# Patient Record
Sex: FEMALE | Race: WHITE | Employment: FULL TIME | URBAN - METROPOLITAN AREA
[De-identification: names, ages, dates, MRNs, and addresses within clinical notes are randomized per-mention and may not be internally consistent; named-entity substitution may affect disease eponyms.]

---

## 2019-04-23 ENCOUNTER — OFFICE VISIT (OUTPATIENT)
Dept: ORTHOPEDIC SURGERY | Age: 49
End: 2019-04-23
Payer: COMMERCIAL

## 2019-04-23 VITALS
SYSTOLIC BLOOD PRESSURE: 112 MMHG | BODY MASS INDEX: 21.16 KG/M2 | HEIGHT: 65 IN | WEIGHT: 127 LBS | HEART RATE: 68 BPM | DIASTOLIC BLOOD PRESSURE: 78 MMHG

## 2019-04-23 DIAGNOSIS — M25.551 PAIN OF RIGHT HIP JOINT: Primary | ICD-10-CM

## 2019-04-23 DIAGNOSIS — M70.61 GREATER TROCHANTERIC BURSITIS OF RIGHT HIP: ICD-10-CM

## 2019-04-23 DIAGNOSIS — M76.899 HAMSTRING TENDINITIS: ICD-10-CM

## 2019-04-23 PROCEDURE — 99203 OFFICE O/P NEW LOW 30 MIN: CPT | Performed by: ORTHOPAEDIC SURGERY

## 2019-04-23 ASSESSMENT — ENCOUNTER SYMPTOMS
EYES NEGATIVE: 1
COUGH: 1
ALLERGIC/IMMUNOLOGIC NEGATIVE: 1
SINUS PRESSURE: 1
GASTROINTESTINAL NEGATIVE: 1
SINUS PAIN: 1

## 2019-04-23 NOTE — PROGRESS NOTES
Subjective:      Patient ID: Jennifer Gresham is a 51 y.o. female. HPI  Jennifer Gresham presents today for evaluation of bilateral hamstring discomfort. She said issues for about a month. She was playing tennis and feels like her legs move as quickly as they should. She can't accelerate. She's had hamstring strains in the past but nothing of significance. At worst pain can be 4 or 5 out of 10. She is an avid  as well as some participates in yoga and cross training. She denies numbness or tingling. She occasionally has some right hip pain as well. She is otherwise healthy. Review of Systems   Constitutional: Negative. HENT: Positive for sinus pressure and sinus pain. Eyes: Negative. Respiratory: Positive for cough. Cardiovascular: Negative. Gastrointestinal: Negative. Endocrine: Negative. Genitourinary: Negative. Musculoskeletal: Positive for arthralgias and gait problem. Right Hip and B Hamstring pain   Skin: Negative. Allergic/Immunologic: Negative. Hematological: Negative. Psychiatric/Behavioral: Negative. Objective:   Physical Exam  General Exam:    Vitals: Blood pressure 112/78, pulse 68, height 5' 5\" (1.651 m), weight 127 lb (57.6 kg). Constitutional: Patient is adequately groomed with no evidence of malnutrition  Mental Status: The patient is oriented to time, place and person. The patient's mood and affect are appropriate. Gait:  Patient walks with normal gait and station. Lymphatic: The lymphatic examination bilaterally reveals all areas to be without enlargement or induration. Vascular: Examination reveals no swelling or calf tenderness. Peripheral pulses are palpable and 2+. Neurological: The patient has good coordination. There is no weakness or sensory deficit. Skin:    Head/Neck: inspection reveals no rashes, ulcerations or lesions. Trunk:  inspection reveals no rashes, ulcerations or lesions.   Right Lower Extremity: inspection reveals no rashes, ulcerations or lesions. Left Lower Extremity: inspection reveals no rashes, ulcerations or lesions. Right hamstring has no obvious deformity or ecchymosis. Straight leg raise is negative at greater than 100°. She has good strength mild discomfort over the ischial tuberosity. She has discomfort in the gluteal region with bridge exercise. She has mild discomfort over the trochanteric bursa on the right side. Left hamstring is no deformity or ecchymosis. Straight leg raises is negative at 110 degrees. .  She has good strength and no tenderness at all. Xray obtained today AP pelvis. It demonstrates: No bony abnormalities  Assessment:      Bilateral gluteal weakness with proximal hamstring tendinitis and right sided trochanteric bursitis      Plan:      I believe she would benefit from activity modification including avoidance of tennis for the next month followed by appropriate physical therapy with an emphasis on gluteal strengthening. She is in agreement and understands this. Follow-up with me on an as-needed basis. This note was created using voice recognition software. It has been proofread, but occasionally errors remain. Please disregard these errors. They will be corrected as they are noted.

## 2019-04-26 ENCOUNTER — HOSPITAL ENCOUNTER (OUTPATIENT)
Dept: PHYSICAL THERAPY | Age: 49
Setting detail: THERAPIES SERIES
Discharge: HOME | End: 2019-04-26
Payer: COMMERCIAL

## 2019-04-26 PROCEDURE — 97110 THERAPEUTIC EXERCISES: CPT

## 2019-04-26 PROCEDURE — 97161 PT EVAL LOW COMPLEX 20 MIN: CPT

## 2019-04-26 NOTE — PLAN OF CARE
[facility], [address] [place], [address]  Phone: [phone]   Fax: [phone]                                                              Physical Therapy Certification    Dear Referring Practitioner: Flory Adams,    We had the pleasure of evaluating the following patient for physical therapy services at [address] [place] [address]. A summary of our findings can be found in the initial assessment below. This includes our plan of care. If you have any questions or concerns regarding these findings, please do not hesitate to contact me at the office phone number checked above. Thank you for the referral.       Physician Signature:_______________________________Date:__________________  By signing above (or electronic signature), therapists plan is approved by physician      Patient: Lalita Laureano   : 1970   MRN: [id number]  Referring Physician: Referring Practitioner: Beth      Evaluation Date: 2019      Medical Diagnosis Information:  Diagnosis: M76.899 (ICD-10-CM) - Hamstring tendinitis; M70.61 (ICD-10-CM) - Greater trochanteric bursitis of right hip   Treatment Diagnosis: M25.551 (ICD-10-CM) - Pain of right hip joint                                           Precautions/ Contra-indications:   Latex Allergy:  ?NO      ? YES  Preferred Language for Healthcare:   ?English       ? other:    SUBJECTIVE:   Per MD : Lalita Laureano presents today for evaluation of bilateral hamstring discomfort. She said issues for about a month. She was playing tennis and feels like her legs move as quickly as they should. She can't accelerate. She's had hamstring strains in the past but nothing of significance. At worst pain can be 4 or 5 out of 10. She is an avid  as well as some participates in yoga and cross training. She denies numbness or tingling. She occasionally has some right hip pain as well.   She (Obers test) + obers + obers    Hip flexor(Solitario test)      gastroc      Rectus femoris(Elys test)              Orthopedic Special Tests:    Special  Test Left Right Comments   FABERS neg Mild discomfort     Scour test neg neg    Impingement test      Trendelenburg test                                     ? Patient history, allergies, meds reviewed. Medical chart reviewed. See intake form. Review Of Systems (ROS):  ?Performed Review of systems (Integumentary, CardioPulmonary, Neurological) by intake and observation. Intake form has been scanned into medical record. Patient has been instructed to contact their primary care physician regarding ROS issues if not already being addressed at this time. Co-morbidities/Complexities (which will affect course of rehabilitation): x? None           Arthritic conditions   ? Rheumatoid arthritis (M05.9)  ? Osteoarthritis (M19.91)   Cardiovascular conditions   ? Hypertension (I10)  ? Hyperlipidemia (E78.5)  ? Angina pectoris (I20)  ? Atherosclerosis (I70)   Musculoskeletal conditions   ? Disc pathology   ? Congenital spine pathologies   ? Prior surgical intervention  ? Osteoporosis (M81.8)  ? Osteopenia (M85.8)   Endocrine conditions   ? Hypothyroid (E03.9)  ? Hyperthyroid Gastrointestinal conditions   ? Constipation (E49.41)   Metabolic conditions   ? Morbid obesity (E66.01)  ? Diabetes type 1(E10.65) or 2 (E11.65)   ? Neuropathy (G60.9)     Pulmonary conditions   ? Asthma (J45)  ? Coughing   ? COPD (J44.9)   Psychological Disorders  ? Anxiety (F41.9)  ? Depression (F32.9)   ? Other:   ?Other:          Barriers to/and or personal factors that will affect rehab potential:              ?Age  ? Sex              ? Motivation/Lack of Motivation                        ? Co-Morbidities              ? Cognitive Function, education/learning barriers              ? Environmental, home barriers              ? profession/work barriers  ? past PT/medical experience  ? other:      Falls Risk Assessment (30 days): ? Falls Risk assessed and no intervention required. ? Falls Risk assessed and Patient requires intervention due to being higher risk   TUG score (>12s at risk):     ? Falls education provided, including       Functional Assessment: PT G-Eileen  Functional Assessment Tool Used: LEFS  Score: 12%   Functional Limitation: Mobility: Walking and moving around    ASSESSMENT:   Functional Impairments:     ? Noted lumbar/proximal hip/LE joint hypomobility   ? Decreased LE functional ROM   x? Decreased core/proximal hip strength and neuromuscular control   x? Decreased LE functional strength   ? Reduced balance/proprioceptive control   ?other:      Functional Activity Limitations (from functional questionnaire and intake)   x? Reduced ability to tolerate prolonged functional positions   ? Reduced ability or difficulty with changes of positions or transfers between positions   ? Reduced ability to maintain good posture and demonstrate good body mechanics with sitting, bending, and lifting   ? Reduced ability to sleep   ? Reduced ability or tolerance with driving and/or computer work   ? Reduced ability to perform lifting, carrying tasks   ? Reduced ability to squat   ? Reduced ability to forward bend   x? Reduced ability to ambulate prolonged functional periods/distances/surfaces   ? Reduced ability to ascend/descend stairs   x? Reduced ability to run, hop, cut or jump   ?other:    Participation Restrictions   x? Reduced participation in self care activities   ? Reduced participation in home management activities   ? Reduced participation in work activities   x? Reduced participation in social activities. x? Reduced participation in sport/recreation activities. Classification :    ?Signs/symptoms consistent with post-surgical status including decreased ROM, strength and function. ? Signs/symptoms consistent with joint sprain/strain   ? Signs/symptoms consistent with patella-femoral syndrome   ? Signs/symptoms consistent with knee OA/hip OA   ?Signs/symptoms consistent with internal derangement of knee/Hip   x? Signs/symptoms consistent with functional hip weakness/NMR control      x? Signs/symptoms consistent with tendinitis/tendinosis    ?signs/symptoms consistent with pathology which may benefit from Dry needling      ?other:      Prognosis/Rehab Potential:      ?Excellent   x? Good    ? Fair   ? Poor    Tolerance of evaluation/treatment:    ?Excellent   x? Good    ? Fair   ? Poor    PLAN  Frequency/Duration:  1-2 days per week for 4-6 Weeks:  Interventions:  x? Therapeutic exercise including: strength training, ROM, for Lower extremity and core   x? NMR activation and proprioception for LE, Glutes and Core   x? Manual therapy as indicated for LE, Hip and spine to include: Dry Needling/IASTM, STM, PROM, Gr I-IV mobilizations, manipulation. x? Modalities as needed that may include: thermal agents, E-stim, Biofeedback, US, iontophoresis as indicated  x? Patient education on joint protection, postural re-education, activity modification, progression of HEP. HEP instruction:The patient's home exercise program was reviewed and they were educated on appropriate frequency, duration and intensity. The enclosed activities were performed and new exercises were added to H.E. P. with detailed pictures included. (see scanned forms)    GOALS:  Patient stated goal: reduce pain, return to tennis     Therapist goals for Patient:   Short Term Goals: To be achieved in: 2 weeks  1. Independent in HEP and progression per patient tolerance, in order to prevent re-injury. 2. Patient will have a decrease in pain to facilitate improvement in movement, function, and ADLs as indicated by Functional Deficits. Long Term Goals: To be achieved in: 4-6 weeks  1. Disability index score of 6% or less for the LEFS to assist with reaching prior level of function.    2. Patient will demonstrate increased AROM to ER painfree to allow for proper joint functioning as indicated by

## 2019-04-26 NOTE — FLOWSHEET NOTE
[address] [facility]                         Physical Therapy Daily Treatment Note  Date:  2019    Patient Name:  Butch Friday    :  1970  MRN: [id number]    Medical/Treatment Diagnosis Information:  · Diagnosis: M76.899 (ICD-10-CM) - Hamstring tendinitis; M70.61 (ICD-10-CM) - Greater trochanteric bursitis of right hip  · Treatment Diagnosis: M25.551 (ICD-10-CM) - Pain of right hip joint  Insurance/Certification information:  PT Insurance Information: [place]   Physician Information:  Referring Practitioner: Henrique Pan of care signed (Y/N):     Date of Patient follow up with Physician:     Functional Assessment:  19  Functional Assessment Tool Used: LEFS  Score: 12%   Functional Limitation: Mobility: Walking and moving around    Progress Note: ?  Yes  ? No  Next due by: Visit #10       Latex Allergy:  ?NO      ? YES  Preferred Language for Healthcare:   ?English       ? other    Visit # Insurance Allowable   1 60     Pain level:  4-5/10     SUBJECTIVE:  See eval    OBJECTIVE: See eval   Observation:    Test measurements:    ROM PROM AROM Comments    Left Right Left Right    Flexion        Extension        Abduction        Adduction        ER        IR                  Flexibility Left Right Comments   Hamstrings      ITB (Obers test)      Hip flexor(Solitario test)      gastroc      Rectus femoris(Elys test)              Special  Test Left Right Comments   FABERS      Scour test      Impingement test      Trendelenburg test              Strength Left Right Comments   Hip flexors      Hip extension      Hip abduction      Hip adduction      Hip ER      Hip IR      Quads      Hamstrings            RESTRICTIONS/PRECAUTIONS    Exercises/Interventions:     Exercise/Equipment Resistance Repetitions Other comments   Stretching      Hamstring      Hip Flexion      ITB- Rope [id number]      Grion      Quad      Inclined Calf      Towel Pull      Piriformis [id number] SLR      Supine      Prone      Abduction 3x10     Adducton      SLR+      Clamshells 3x10            Isometrics      Quad sets      Ball Squeezes      Glut sets -[id number]  -L>R x5 10sec hold   -R>L x5 10 sec hold   -Bilateral then relax L>R, R>L x5 each                  Patellar Glides      Medial      Superior      Inferior            ROM      Passive      Active      Weight Shift      Hang Weights      Sheet Pulls      Ankle Pumps            CKC      Calf raises      Wall sits      Step ups      1 leg stand      Squatting      CC TKE      Balance      Monster Walks      Bridging      Triple threats      Stool Scoots      PRE      Extension   RANGE:   Flexion   RANGE:         Cable Column            Leg Press   RANGE:         Bike      Treadmill                  Therapeutic Exercise and NMR EXR  ? (20654) Provided verbal/tactile cueing for activities related to strengthening, flexibility, endurance, ROM for improvements in LE, proximal hip, and core control with self care, mobility, lifting, ambulation. ? (33704) Provided verbal/tactile cueing for activities related to improving balance, coordination, kinesthetic sense, posture, motor skill, proprioception  to assist with LE, proximal hip, and core control in self care, mobility, lifting, ambulation and eccentric single leg control.      NMR and Therapeutic Activities:    ? (37595 or 93237) Provided verbal/tactile cueing for activities related to improving balance, coordination, kinesthetic sense, posture, motor skill, proprioception and motor activation to allow for proper function of core, proximal hip and LE with self care and ADLs  ? (14507) Gait Re-education- Provided training and instruction to the patient for proper LE, core and proximal hip recruitment and positioning and eccentric body weight control with ambulation re-education including up and down stairs     Home Exercise Program:    ? (22912) Reviewed/Progressed HEP activities 4+/5 or greater  to allow for proper functional mobility as indicated by patients Functional Deficits. 4. Patient will return to ambulation on all surfaces without increased symptoms or restriction. 5. Patient will be able to return to playing tennis without increased symptoms or restriction. Progression Towards Functional goals:  ? Patient is progressing as expected towards functional goals listed. ? Progression is slowed due to complexities listed. ? Progression has been slowed due to co-morbidities. ? Plan just implemented, too soon to assess goals progression  ? Other:     ASSESSMENT:  See eval    Treatment/Activity Tolerance:  ? Patient tolerated treatment well ? Patient limited by fatique  ? Patient limited by pain  ? Patient limited by other medical complications  ? Other:     Prognosis: ? Good ? Fair  ? Poor    Patient Requires Follow-up: ? Yes  ? No    PLAN: See eval  ? Continue per plan of care ? Alter current plan (see comments)  ? Plan of care initiated ? Hold pending MD visit ?  Discharge    Electronically signed by:   Oliver Carpenter, PT, DPT, Cert DN

## 2019-05-02 ENCOUNTER — HOSPITAL ENCOUNTER (OUTPATIENT)
Dept: PHYSICAL THERAPY | Age: 49
Setting detail: THERAPIES SERIES
Discharge: HOME | End: 2019-05-02
Payer: COMMERCIAL

## 2019-05-02 PROCEDURE — 97530 THERAPEUTIC ACTIVITIES: CPT

## 2019-05-02 PROCEDURE — 97112 NEUROMUSCULAR REEDUCATION: CPT

## 2019-05-02 PROCEDURE — 97110 THERAPEUTIC EXERCISES: CPT

## 2019-05-02 NOTE — FLOWSHEET NOTE
[facility] [facility]                         Physical Therapy Daily Treatment Note  Date:  2019    Patient Name:  Mario Ontiveros    :  1970  MRN: [id number]    Medical/Treatment Diagnosis Information:  · Diagnosis: M76.899 (ICD-10-CM) - Hamstring tendinitis; M70.61 (ICD-10-CM) - Greater trochanteric bursitis of right hip  · Treatment Diagnosis: M25.551 (ICD-10-CM) - Pain of right hip joint  Insurance/Certification information:  PT Insurance Information: [place]   Physician Information:  Referring Practitioner: Erin Cm of care signed (Y/N):     Date of Patient follow up with Physician:     Functional Assessment:  19  Functional Assessment Tool Used: LEFS  Score: 12%   Functional Limitation: Mobility: Walking and moving around    Progress Note: ?  Yes  ? No  Next due by: Visit #10       Latex Allergy:  ?NO      ? YES  Preferred Language for Healthcare:   ?English       ? other    Visit # Insurance Allowable   2 60     Pain level: 3/10     SUBJECTIVE:  Pt reports she feels less discomfort in buttocks with sitting. She was feeling better along side of hip until Tuesday and feels like she may have tried to overstretch and it is slightly irritated again.      OBJECTIVE: See eval   Observation:    Test measurements:    ROM PROM AROM Comments    Left Right Left Right    Flexion        Extension        Abduction        Adduction        ER        IR                  Flexibility Left Right Comments   Hamstrings      ITB (Obers test)      Hip flexor(Solitario test)      gastroc      Rectus femoris(Elys test)              Special  Test Left Right Comments   FABERS      Scour test      Impingement test      Trendelenburg test              Strength Left Right Comments   Hip flexors      Hip extension      Hip abduction      Hip adduction      Hip ER      Hip IR      Quads      Hamstrings            RESTRICTIONS/PRECAUTIONS    Exercises/Interventions: Exercise/Equipment Resistance Repetitions Other comments   Stretching      Hamstring      Hip Flexion      ITB- Rope [id number]      Grion      Quad      Inclined Calf      Towel Pull      Piriformis [id number]                        SLR      Supine      Prone      Abduction 3x10     Adducton      SLR+      Clamshells 3x10 green band            Isometrics      Quad sets      Ball Squeezes      Glut sets -[id number]                   Patellar Glides      Medial      Superior      Inferior            ROM      Passive      Active      Weight Shift      Hang Weights      Sheet Pulls      Ankle Pumps            CKC      Calf raises      Wall sits      Step ups      1 leg stand      Squatting rockerboard mini squat hold [id number]      CC TKE      Balance      Monster Walks 3 shorts laps green      Bridging 3x10  Focus on glut squeeze before lifting     Triple threats      Stool Scoots      PRE      Extension   RANGE:   Flexion   RANGE:         Cable Column            Leg Press   RANGE:         Bike 8 min 2.5 resistance      Treadmill                  Therapeutic Exercise and NMR EXR  ? (96282) Provided verbal/tactile cueing for activities related to strengthening, flexibility, endurance, ROM for improvements in LE, proximal hip, and core control with self care, mobility, lifting, ambulation. ? (39338) Provided verbal/tactile cueing for activities related to improving balance, coordination, kinesthetic sense, posture, motor skill, proprioception  to assist with LE, proximal hip, and core control in self care, mobility, lifting, ambulation and eccentric single leg control.      NMR and Therapeutic Activities:    ? (66128 or 13428) Provided verbal/tactile cueing for activities related to improving balance, coordination, kinesthetic sense, posture, motor skill, proprioception and motor activation to allow for proper function of core, proximal hip and LE with self care and ADLs  ? (92232) Gait Re-education- Provided training and instruction to the patient for proper LE, core and proximal hip recruitment and positioning and eccentric body weight control with ambulation re-education including up and down stairs     Home Exercise Program:    ? (69210) Reviewed/Progressed HEP activities related to strengthening, flexibility, endurance, ROM of core, proximal hip and LE for functional self-care, mobility, lifting and ambulation/stair navigation   ? (43512)Reviewed/Progressed HEP activities related to improving balance, coordination, kinesthetic sense, posture, motor skill, proprioception of core, proximal hip and LE for self care, mobility, lifting, and ambulation/stair navigation      Manual Treatments:  PROM / STM / Oscillations-Mobs:  G-I, II, III, IV (PA's, Inf., Post.)  ? (53165) Provided manual therapy to mobilize LE, proximal hip and/or LS spine soft tissue/joints for the purpose of modulating pain, promoting relaxation,  increasing ROM, reducing/eliminating soft tissue swelling/inflammation/restriction, improving soft tissue extensibility and allowing for proper ROM for normal function with self care, mobility, lifting and ambulation. Modalities:      Charges:  Timed Code Treatment Minutes: 43   Total Treatment Minutes: 69     ? EVAL (LOW) 26782   ? EVAL (MOD) 31475   ? EVAL (HIGH) 35929   ? RE-EVAL   x? CF(62737) x   1  ? IONTO  x? NMR (88066) x 1     ? VASO  ? Manual (71406) x      ? Other:  x? TA x  1    ? Mech Traction (21463)  ? ES(attended) (23030)      ? ES (un) (81405):       GOALS:   Patient stated goal: reduce pain, return to tennis     Therapist goals for Patient:   Short Term Goals: To be achieved in: 2 weeks  1. Independent in HEP and progression per patient tolerance, in order to prevent re-injury. 2. Patient will have a decrease in pain to facilitate improvement in movement, function, and ADLs as indicated by Functional Deficits. Long Term Goals: To be achieved in: 4-6 weeks  1.  Disability index score of 6% or less

## 2019-05-10 ENCOUNTER — HOSPITAL ENCOUNTER (OUTPATIENT)
Dept: PHYSICAL THERAPY | Age: 49
Setting detail: THERAPIES SERIES
Discharge: HOME | End: 2019-05-10
Payer: COMMERCIAL

## 2019-05-10 PROCEDURE — 97110 THERAPEUTIC EXERCISES: CPT

## 2019-05-10 PROCEDURE — 97140 MANUAL THERAPY 1/> REGIONS: CPT

## 2019-05-10 PROCEDURE — 97112 NEUROMUSCULAR REEDUCATION: CPT

## 2019-05-10 NOTE — FLOWSHEET NOTE
[facility]                         Physical Therapy Daily Treatment Note  Date:  5/10/2019    Patient Name:  Quinton Batres    :  1970  MRN: [id number]    Medical/Treatment Diagnosis Information:  · Diagnosis: M76.899 (ICD-10-CM) - Hamstring tendinitis; M70.61 (ICD-10-CM) - Greater trochanteric bursitis of right hip  · Treatment Diagnosis: M25.551 (ICD-10-CM) - Pain of right hip joint  Insurance/Certification information:  PT Insurance Information: [place]   Physician Information:  Referring Practitioner: Nemiah Schools of care signed (Y/N):     Date of Patient follow up with Physician:     Functional Assessment:  19  Functional Assessment Tool Used: LEFS  Score: 12%   Functional Limitation: Mobility: Walking and moving around    Progress Note: ?  Yes  ? No  Next due by: Visit #10       Latex Allergy:  ?NO      ? YES  Preferred Language for Healthcare:   ?English       ? other    Visit # Insurance Allowable   3 60     Pain level: 3/10     SUBJECTIVE:  Pt reports lateral hip and now anterior right hip are continuing to have pain with sitting/standing/walking. She did try to do a light jog and had increased pain in right hip. She has been standing more at work but was in a long meeting mid week and notes she had increased pain with having to sit so long.      OBJECTIVE: See eval   Observation:    Test measurements:    ROM PROM AROM Comments    Left Right Left Right    Flexion        Extension        Abduction        Adduction        ER        IR                  Flexibility Left Right Comments   Hamstrings      ITB (Obers test)      Hip flexor(Solitario test)      gastroc      Rectus femoris(Elys test)              Special  Test Left Right Comments   FABERS      Scour test      Impingement test      Trendelenburg test              Strength Left Right Comments   Hip flexors      Hip extension      Hip abduction      Hip adduction      Hip ER      Hip IR Quads      Hamstrings            RESTRICTIONS/PRECAUTIONS    Exercises/Interventions:     Exercise/Equipment Resistance Repetitions Other comments   Stretching      Hamstring      Hip Flexor Half kneel runners [id number]     ITB- Rope [id number]      Grion      Quad      Inclined Calf      Towel Pull      Piriformis [id number]                        SLR      Supine      Prone      Abduction 3x10     Adducton      SLR+      Clamshells 3x10 green band            Isometrics      Quad sets      Ball Squeezes      Glut sets -[id number] b                   Patellar Glides      Medial      Superior      Inferior            ROM      Passive      Active      Weight Shift      Hang Weights      Sheet Pulls      Ankle Pumps            CKC      Calf raises      Wall sits      Step ups      1 leg stand      Squatting rockerboard mini squat 3x10     CC TKE      Balance      Monster Walks 3 shorts laps green      Bridging 3x10 feet on bosu Focus on glut squeeze before lifting     Triple threats      Hip sliders 3x10      PRE      Extension   RANGE:   Flexion   RANGE:         Cable Column            Leg Press   RANGE:         Bike 8 min 2.5 resistance      Treadmill                  Therapeutic Exercise and NMR EXR  ? (01059) Provided verbal/tactile cueing for activities related to strengthening, flexibility, endurance, ROM for improvements in LE, proximal hip, and core control with self care, mobility, lifting, ambulation. ? (14656) Provided verbal/tactile cueing for activities related to improving balance, coordination, kinesthetic sense, posture, motor skill, proprioception  to assist with LE, proximal hip, and core control in self care, mobility, lifting, ambulation and eccentric single leg control.      NMR and Therapeutic Activities:    ? (08977 or 32473) Provided verbal/tactile cueing for activities related to improving balance, coordination, kinesthetic sense, posture, motor skill, proprioception and motor activation to allow for proper function of core, proximal hip and LE with self care and ADLs  ? (92321) Gait Re-education- Provided training and instruction to the patient for proper LE, core and proximal hip recruitment and positioning and eccentric body weight control with ambulation re-education including up and down stairs     Home Exercise Program:    ? (21568) Reviewed/Progressed HEP activities related to strengthening, flexibility, endurance, ROM of core, proximal hip and LE for functional self-care, mobility, lifting and ambulation/stair navigation   ? (69615)Reviewed/Progressed HEP activities related to improving balance, coordination, kinesthetic sense, posture, motor skill, proprioception of core, proximal hip and LE for self care, mobility, lifting, and ambulation/stair navigation      Manual Treatments:  PROM / STM / Oscillations-Mobs:  G-I, II, III, IV (PA's, Inf., Post.)  ? (16927) Provided manual therapy to mobilize LE, proximal hip and/or LS spine soft tissue/joints for the purpose of modulating pain, promoting relaxation,  increasing ROM, reducing/eliminating soft tissue swelling/inflammation/restriction, improving soft tissue extensibility and allowing for proper ROM for normal function with self care, mobility, lifting and ambulation. Myofascial roller: right piriformis and ITB 8'     Modalities:      Charges:  Timed Code Treatment Minutes: 47   Total Treatment Minutes: 60     ? EVAL (LOW) 01962   ? EVAL (MOD) 26558   ? EVAL (HIGH) 05019   ? RE-EVAL   x? ZO(56314) x   1  ? IONTO  ? NMR (20872) x      ? VASO  x? Manual (79580) x 1     ? Other:  x? TA x  1    ? [facility] Traction (75965)  ? ES(attended) (91811)      ? ES (un) (03408):       GOALS:   Patient stated goal: reduce pain, return to tennis     Therapist goals for Patient:   Short Term Goals: To be achieved in: 2 weeks  1. Independent in HEP and progression per patient tolerance, in order to prevent re-injury.    2. Patient will have a decrease in pain to facilitate improvement in movement, function, and ADLs as indicated by Functional Deficits. Long Term Goals: To be achieved in: 4-6 weeks  1. Disability index score of 6% or less for the LEFS to assist with reaching prior level of function. 2. Patient will demonstrate increased AROM to ER painfree to allow for proper joint functioning as indicated by patients Functional Deficits. 3. Patient will demonstrate an increase in Strength to 4+/5 or greater  to allow for proper functional mobility as indicated by patients Functional Deficits. 4. Patient will return to ambulation on all surfaces without increased symptoms or restriction. 5. Patient will be able to return to playing tennis without increased symptoms or restriction. Progression Towards Functional goals:  ? Patient is progressing as expected towards functional goals listed. ? Progression is slowed due to complexities listed. ? Progression has been slowed due to co-morbidities. ? Plan just implemented, too soon to assess goals progression  ? Other:     ASSESSMENT:  See eval    Treatment/Activity Tolerance:  ? Patient tolerated treatment well ? Patient limited by fatique  ? Patient limited by pain  ? Patient limited by other medical complications  ? Other:   Pt has a few areas of adhesions along ITB and in piriformis with myofasical roller but is able to tolerate moderate pressure. We discussed possibility of IASTM or DN to area as needed, pt has myoroller at home to use. Prognosis: ? Good ? Fair  ? Poor    Patient Requires Follow-up: ? Yes  ? No    PLAN: See eval  ? Continue per plan of care ? Alter current plan (see comments)  ? Plan of care initiated ? Hold pending MD visit ?  Discharge    Electronically signed by:   Haja Calix, PT, DPT, Cert DN

## 2019-05-16 ENCOUNTER — APPOINTMENT (OUTPATIENT)
Dept: PHYSICAL THERAPY | Age: 49
End: 2019-05-16
Payer: COMMERCIAL

## 2019-05-21 ENCOUNTER — HOSPITAL ENCOUNTER (OUTPATIENT)
Dept: PHYSICAL THERAPY | Age: 49
Setting detail: THERAPIES SERIES
Discharge: HOME | End: 2019-05-21
Payer: COMMERCIAL

## 2019-05-21 PROCEDURE — 97530 THERAPEUTIC ACTIVITIES: CPT

## 2019-05-21 PROCEDURE — 97110 THERAPEUTIC EXERCISES: CPT

## 2019-05-21 PROCEDURE — 97140 MANUAL THERAPY 1/> REGIONS: CPT

## 2019-05-21 NOTE — FLOWSHEET NOTE
[facility] [facility]                         Physical Therapy Daily Treatment Note  Date:  2019    Patient Name:  Rolly Valdivia    :  1970  MRN: [id number]    Medical/Treatment Diagnosis Information:  · Diagnosis: M76.899 (ICD-10-CM) - Hamstring tendinitis; M70.61 (ICD-10-CM) - Greater trochanteric bursitis of right hip  · Treatment Diagnosis: M25.551 (ICD-10-CM) - Pain of right hip joint  Insurance/Certification information:  PT Insurance Information: [place]   Physician Information:  Referring Practitioner: Doug Myers of [employer] signed (Y/N):     Date of Patient follow up with Physician:     Functional Assessment:  19  Functional Assessment Tool Used: LEFS  Score: 12%   Functional Limitation: Mobility: Walking and moving around    Progress Note: ?  Yes  ? No  Next due by: Visit #10       Latex Allergy:  ?NO      ? YES  Preferred Language for Healthcare:   ?English       ? other    Visit # Insurance Allowable   4 60     Pain level: -2/10     SUBJECTIVE:  Pt reports she has noticed improvements in hamstring discomfort with sitting. She notes that she still has discomfort laterally on right side but has been doing more rolling and feels some mild improvement.      OBJECTIVE: See eval   Observation:    Test measurements:    ROM PROM AROM Comments    Left Right Left Right    Flexion        Extension        Abduction        Adduction        ER        IR                  Flexibility Left Right Comments   Hamstrings      ITB (Obers test)      Hip flexor(Solitario test)      gastroc      Rectus femoris(Elys test)              Special  Test Left Right Comments   FABERS      Scour test      Impingement test      Trendelenburg test              Strength Left Right Comments   Hip flexors      Hip extension      Hip abduction      Hip adduction      Hip ER      Hip IR      Quads      Hamstrings            RESTRICTIONS/PRECAUTIONS    Exercises/Interventions: Exercise/Equipment Resistance Repetitions Other comments   Stretching      Hamstring      Hip Flexor Half kneel runners [id number]     ITB- Rope [id number]      Grion      Quad      Inclined Calf      Towel Pull      Piriformis 0t29ozz                        SLR      Supine      Prone      Abduction 3x10 2#     Adducton      SLR+      Clamshells 3x10 green band            Isometrics      Quad sets      Ball Squeezes      Glut sets -05t93wpu b                   Patellar Glides      Medial      Superior      Inferior            ROM      Passive      Active      Weight Shift      Hang Weights      Sheet Pulls      Ankle Pumps            CKC      Calf raises      Wall sits      Step ups      1 leg stand      Squatting rockerboard mini squat 3x10     CC TKE      Balance      Monster Walks 3 shorts laps green      Bridging 3x10 feet on bosu Focus on glut squeeze before lifting     Triple threats      Hip sliders 3x10      PRE      Extension   RANGE:   Flexion   RANGE:         Cable Column            Leg Press   RANGE:           Treadmill                  Therapeutic Exercise and NMR EXR  ? (42322) Provided verbal/tactile cueing for activities related to strengthening, flexibility, endurance, ROM for improvements in LE, proximal hip, and core control with self care, mobility, lifting, ambulation. ? (23149) Provided verbal/tactile cueing for activities related to improving balance, coordination, kinesthetic sense, posture, motor skill, proprioception  to assist with LE, proximal hip, and core control in self care, mobility, lifting, ambulation and eccentric single leg control.      NMR and Therapeutic Activities:    ? (04687 or 08332) Provided verbal/tactile cueing for activities related to improving balance, coordination, kinesthetic sense, posture, motor skill, proprioception and motor activation to allow for proper function of core, proximal hip and LE with self care and ADLs  ? (20241) Gait Re-education- Provided training and instruction to the patient for proper LE, core and proximal hip recruitment and positioning and eccentric body weight control with ambulation re-education including up and down stairs     Home Exercise Program:    ? (07786) Reviewed/Progressed HEP activities related to strengthening, flexibility, endurance, ROM of core, proximal hip and LE for functional self-care, mobility, lifting and ambulation/stair navigation   ? (15037)Reviewed/Progressed HEP activities related to improving balance, coordination, kinesthetic sense, posture, motor skill, proprioception of core, proximal hip and LE for self care, mobility, lifting, and ambulation/stair navigation      Manual Treatments:  PROM / STM / Oscillations-Mobs:  G-I, II, III, IV (PA's, Inf., Post.)  ? (40692) Provided manual therapy to mobilize LE, proximal hip and/or LS spine soft tissue/joints for the purpose of modulating pain, promoting relaxation,  increasing ROM, reducing/eliminating soft tissue swelling/inflammation/restriction, improving soft tissue extensibility and allowing for proper ROM for normal function with self care, mobility, lifting and ambulation. Myofascial roller: right piriformis and ITB 8'   Instrument Assisted Soft Tissue Mobilization (IASTM): was applied to the following muscles: Right glut, ITB  with [facility]  tools including HG2 (handle-bar), HG4 (small can-opener), HG5 (medium can-opener), HG 8 (biscotti), and HG9 (tongue depressor). Treatment consisted of IASTM strokes including sweeping, fanning, brushing, strumming, filleting, pinning and framing, based on body region contours, nature of the soft tissue restriction and desired treatment outcomes. These techniques were used to restore neurophysiology, improve mechanotransduction, enhance fluid dynamics and break collagen crosslinks. The treatment area was exposed and the patient was draped in an appropriate manner.  Upon completion the clinician cleaned the IASTM tools as per limited by pain  ? Patient limited by other medical complications  ? Other:   Pt able to tolerate moderate pressure with IASTM today, was most restricted in distal ITB and gluts near greater troch. Exercises and stretching performed afterwards to assist with tissue realignment. Pt notes that with stretches she feels more flexible after IASTM. Continue with manual tx as needed for a few more sessions to resolve pts symptoms. Prognosis: ? Good ? Fair  ? Poor    Patient Requires Follow-up: ? Yes  ? No    PLAN: See eval  ? Continue per plan of care ? Alter current plan (see comments)  ? Plan of care initiated ? Hold pending MD visit ?  Discharge    Electronically signed by:   Rosalina Rico, PT, DPT, Cert DN

## 2019-05-28 ENCOUNTER — HOSPITAL ENCOUNTER (OUTPATIENT)
Dept: PHYSICAL THERAPY | Age: 49
Setting detail: THERAPIES SERIES
Discharge: HOME | End: 2019-05-28
Payer: COMMERCIAL

## 2019-05-28 PROCEDURE — 97110 THERAPEUTIC EXERCISES: CPT

## 2019-05-28 PROCEDURE — 97140 MANUAL THERAPY 1/> REGIONS: CPT

## 2019-05-28 NOTE — FLOWSHEET NOTE
[facility]                         Physical Therapy Daily Treatment Note  Date:  2019    Patient Name:  Jennifer Gresham    :  1970  MRN: [id number]    Medical/Treatment Diagnosis Information:  · Diagnosis: M76.899 (ICD-10-CM) - Hamstring tendinitis; M70.61 (ICD-10-CM) - Greater trochanteric bursitis of right hip  · Treatment Diagnosis: M25.551 (ICD-10-CM) - Pain of right hip joint  Insurance/Certification information:  PT Insurance Information: [place]   Physician Information:  Referring Practitioner: Emanuel Beltran of care signed (Y/N):     Date of Patient follow up with Physician:     Functional Assessment:  19  Functional Assessment Tool Used: LEFS  Score: 12%   Functional Limitation: Mobility: Walking and moving around    Progress Note: ?  Yes  ? No  Next due by: Visit #10       Latex Allergy:  ?NO      ? YES  Preferred Language for Healthcare:   ?English       ? other    Visit # Insurance Allowable   5 60     Pain level: -2/10     SUBJECTIVE:  Pt reports that she did feel improvements with IASTM last week but it did not last long. She notes that she played tennis and had very little discomfort but did avoid lateral motions to right side if she felt ball was out of reach without lunging towards with right leg.    OBJECTIVE: See eval   Observation:    Test measurements:    ROM PROM AROM Comments    Left Right Left Right    Flexion        Extension        Abduction        Adduction        ER        IR                  Flexibility Left Right Comments   Hamstrings      ITB (Obers test)      Hip flexor(Solitario test)      gastroc      Rectus femoris(Elys test)              Special  Test Left Right Comments   FABERS      Scour test      Impingement test      Trendelenburg test              Strength Left Right Comments   Hip flexors      Hip extension      Hip abduction      Hip adduction      Hip ER      Hip IR      Quads      Hamstrings RESTRICTIONS/PRECAUTIONS    Exercises/Interventions:     Exercise/Equipment Resistance Repetitions Other comments   Stretching      Hamstring      Hip Flexor Half kneel runners [id number]  Stretching before and after DN    ITB- Rope [id number]      Grion      Quad      Inclined Calf      Towel Pull      Piriformis [id number]                    SLR    Supine    Prone    Abduction    Adducton    SLR+    Clamshells        Isometrics    Quad sets    Ball Squeezes    Glut sets            Patellar Glides    Medial    Superior    Inferior        ROM    Passive    Active    Weight Shift    Hang Weights    Sheet Pulls    Ankle Pumps        CKC    Calf raises    Wall sits    Step ups    1 leg stand    Squatting    CC TKE    Balance    Monster Walks    Bridging    Triple threats    Hip sliders    PRE      Extension   RANGE:   Flexion   RANGE:         Cable Column            Leg Press   RANGE:           Treadmill                  Therapeutic Exercise and NMR EXR  ? (04353) Provided verbal/tactile cueing for activities related to strengthening, flexibility, endurance, ROM for improvements in LE, proximal hip, and core control with self care, mobility, lifting, ambulation. ? (87425) Provided verbal/tactile cueing for activities related to improving balance, coordination, kinesthetic sense, posture, motor skill, proprioception  to assist with LE, proximal hip, and core control in self care, mobility, lifting, ambulation and eccentric single leg control.      NMR and Therapeutic Activities:    ? (73739 or 79566) Provided verbal/tactile cueing for activities related to improving balance, coordination, kinesthetic sense, posture, motor skill, proprioception and motor activation to allow for proper function of core, proximal hip and LE with self care and ADLs  ? (47476) Gait Re-education- Provided training and instruction to the patient for proper LE, core and proximal hip recruitment and positioning and eccentric body weight control with ambulation re-education including up and down stairs     Home Exercise Program:    ? (97450) Reviewed/Progressed HEP activities related to strengthening, flexibility, endurance, ROM of core, proximal hip and LE for functional self-care, mobility, lifting and ambulation/stair navigation   ? (54755)Reviewed/Progressed HEP activities related to improving balance, coordination, kinesthetic sense, posture, motor skill, proprioception of core, proximal hip and LE for self care, mobility, lifting, and ambulation/stair navigation      Manual Treatments:  PROM / STM / Oscillations-Mobs:  G-I, II, III, IV (PA's, Inf., Post.)  ? (61596) Provided manual therapy to mobilize LE, proximal hip and/or LS spine soft tissue/joints for the purpose of modulating pain, promoting relaxation,  increasing ROM, reducing/eliminating soft tissue swelling/inflammation/restriction, improving soft tissue extensibility and allowing for proper ROM for normal function with self care, mobility, lifting and ambulation. PT reviewed precautions, contraindications, and indications with pt in addition to application of dry needling within established plan of care and expected outcomes; pt verbalized understanding with all questions answered. Pt had signed consent form for dry needling and PT obtained written consent with updated plan of care from MD before beginning. Dry needling manual therapy: consisted on the placement of 4 needles in the following muscles: ITB near greater trochanter,  A 75 mm needle was inserted, piston, rotated, and coned to produce intramuscular mobilization. These techniques were used to restore functional range of motion, reduce muscle spasm and induce healing in the corresponding musculature. (57500)  Clean Technique was utilized today while applying Dry needling treatment. The treatment sites where cleaned with 70% solution of  isopropyl alcohol .   The PT washed their hands and utilized treatment gloves along with hand  prior to inserting the needles. All needles where removed and discarded in the appropriate sharps container. Modalities:      Charges:  Timed Code Treatment Minutes: 41   Total Treatment Minutes: 09     ? EVAL (LOW) 38437   ? EVAL (MOD) 38743   ? EVAL (HIGH) 51861   ? RE-EVAL   x? AE(86041) x   2  ? IONTO  ? NMR (86253) x      ? VASO  x? Manual (70598) x 1     ? Other:  ? TA x      ? Mech Traction (88281)  ? ES(attended) (25370)      ? ES (un) (80814):       GOALS:   Patient stated goal: reduce pain, return to tennis     Therapist goals for Patient:   Short Term Goals: To be achieved in: 2 weeks  1. Independent in HEP and progression per patient tolerance, in order to prevent re-injury. 2. Patient will have a decrease in pain to facilitate improvement in movement, function, and ADLs as indicated by Functional Deficits. Long Term Goals: To be achieved in: 4-6 weeks  1. Disability index score of 6% or less for the LEFS to assist with reaching prior level of function. 2. Patient will demonstrate increased AROM to ER painfree to allow for proper joint functioning as indicated by patients Functional Deficits. 3. Patient will demonstrate an increase in Strength to 4+/5 or greater  to allow for proper functional mobility as indicated by patients Functional Deficits. 4. Patient will return to ambulation on all surfaces without increased symptoms or restriction. 5. Patient will be able to return to playing tennis without increased symptoms or restriction. Progression Towards Functional goals:  ? Patient is progressing as expected towards functional goals listed. ? Progression is slowed due to complexities listed. ? Progression has been slowed due to co-morbidities. ? Plan just implemented, too soon to assess goals progression  ? Other:     ASSESSMENT:  See eval    Treatment/Activity Tolerance:  ? Patient tolerated treatment well ? Patient limited by fatique  ?  Patient limited by pain  ? Patient limited by other medical complications  ? Other:   Pt tolerated DN well with minimal discomfort. She notes increased flexibility with stretches following needling. Monitor symptoms following DN and continue if beneficial as tolerated       Prognosis: ? Good ? Fair  ? Poor    Patient Requires Follow-up: ? Yes  ? No    PLAN: See eval  ? Continue per plan of care ? Alter current plan (see comments)  ? Plan of care initiated ? Hold pending MD visit ?  Discharge    Electronically signed by:   Carol Uriostegui, PT, DPT, Cert DN

## 2021-11-22 ENCOUNTER — OFFICE VISIT (OUTPATIENT)
Dept: ORTHOPEDIC SURGERY | Age: 51
End: 2021-11-22
Payer: COMMERCIAL

## 2021-11-22 VITALS — HEIGHT: 65 IN | BODY MASS INDEX: 21.66 KG/M2 | WEIGHT: 130 LBS | RESPIRATION RATE: 12 BRPM

## 2021-11-22 DIAGNOSIS — M25.561 ACUTE PAIN OF RIGHT KNEE: Primary | ICD-10-CM

## 2021-11-22 DIAGNOSIS — S83.281A TRAUMATIC TEAR OF LATERAL MENISCUS OF RIGHT KNEE, INITIAL ENCOUNTER: ICD-10-CM

## 2021-11-22 DIAGNOSIS — M23.91 INTERNAL DERANGEMENT OF RIGHT KNEE: ICD-10-CM

## 2021-11-22 PROCEDURE — 99214 OFFICE O/P EST MOD 30 MIN: CPT | Performed by: ORTHOPAEDIC SURGERY

## 2021-11-22 NOTE — PROGRESS NOTES
Fili Bateman is seen today for evaluation of right knee injury. She was playing tennis this weekend when she felt a pop in the lateral aspect of the posterior right knee. She fell to the ground and pain. She now has pain that is 8 out of 10 with bending and 6 out of 10 with stairs in the lateral right knee. She is using ice and ibuprofen. She denies prior right knee problems. She works in [employer] and is otherwise healthy other than hypothyroid. History: Patient's relevant past family, medical, and social history are reviewed as part of today's visit. ROS of pertinent positives and negatives as above; otherwise negative. General Exam:    Vitals: Resp. rate 12, height 5' 5\" (1.651 m), weight 130 lb (59 kg). Constitutional: Patient is adequately groomed with no evidence of malnutrition  Mental Status: The patient is oriented to time, place and person. The patient's mood and affect are appropriate. Gait:  Patient walks with an antalgic gait  Lymphatic: The lymphatic examination bilaterally reveals all areas to be without enlargement or induration. Vascular: Examination reveals no swelling or calf tenderness. Peripheral pulses are palpable and 2+. Neurological: The patient has good coordination. There is no weakness or sensory deficit. Skin:    Head/Neck: inspection reveals no rashes, ulcerations or lesions. Trunk:  inspection reveals no rashes, ulcerations or lesions. Right Lower Extremity: inspection reveals no rashes, ulcerations or lesions. Left Lower Extremity: inspection reveals no rashes, ulcerations or lesions. Examination of the bilateral hips reveals normal flexion and extension. There is no restriction in rotation. There is no tenderness to palpation anteriorly posteriorly or laterally. Left knee examination demonstrates no effusion. There is no tenderness to palpation over the medial or lateral joint line. There is no discomfort over the patellar tendon.   There is no palpable popliteal cyst.  Sensation is intact. Range of motion is normal.  There is no patellofemoral crepitation. There is no instability to varus or valgus stress applied at 0, 30, 60, or 90Â° of flexion. There is no anterior or posterior drawer. Lachman examination is normal.    Right knee has tenderness over the lateral joint line and posterior lateral knee. She has significant pain with flexion beyond 90 degrees. She has moderate pain in extension. She has no drainable effusion although there is some swelling. She has pain with Osiris's maneuver laterally as well. I cannot elicit gross instability. There is no ecchymosis or deformity. X-rays were obtained today in the office and interpreted by me. AP standing, PA flexed, and merchant views of the bilateral knees as well as a lateral of the right knee. These demonstrate:  No bony abnormalities. Assessment: Differential diagnosis includes lateral hamstring or gastroc strain versus lateral meniscus tear. Plan: MRI right knee.     Follow-up with me after the scan

## 2021-11-23 ENCOUNTER — TELEPHONE (OUTPATIENT)
Dept: ORTHOPEDIC SURGERY | Age: 51
End: 2021-11-23

## 2021-11-23 NOTE — TELEPHONE ENCOUNTER
Appointment Request     Patient requesting earlier appointment: Yes  Appointment offered to patient: PATIENT HAD MRI TODAY AND WOULD LIKE TO BE SEEN TOMORROW 11/24/21  Patient Contact Number: [phone]

## 2021-11-24 ENCOUNTER — OFFICE VISIT (OUTPATIENT)
Dept: ORTHOPEDIC SURGERY | Age: 51
End: 2021-11-24
Payer: COMMERCIAL

## 2021-11-24 VITALS — WEIGHT: 130 LBS | BODY MASS INDEX: 21.66 KG/M2 | HEIGHT: 65 IN | RESPIRATION RATE: 12 BRPM

## 2021-11-24 DIAGNOSIS — M23.91 INTERNAL DERANGEMENT OF RIGHT KNEE: ICD-10-CM

## 2021-11-24 DIAGNOSIS — M25.561 ACUTE PAIN OF RIGHT KNEE: Primary | ICD-10-CM

## 2021-11-24 PROCEDURE — 99213 OFFICE O/P EST LOW 20 MIN: CPT | Performed by: ORTHOPAEDIC SURGERY

## 2021-11-24 RX ORDER — MELOXICAM 15 MG/1
15 TABLET ORAL DAILY
Qty: 30 TABLET | Refills: 1 | Status: SHIPPED | OUTPATIENT
Start: 2021-11-24 | End: 2022-01-24

## 2021-11-29 ENCOUNTER — HOSPITAL ENCOUNTER (OUTPATIENT)
Dept: PHYSICAL THERAPY | Age: 51
Setting detail: THERAPIES SERIES
Discharge: HOME | End: 2021-11-29
Payer: COMMERCIAL

## 2021-11-29 PROCEDURE — 97530 THERAPEUTIC ACTIVITIES: CPT

## 2021-11-29 PROCEDURE — 97161 PT EVAL LOW COMPLEX 20 MIN: CPT

## 2021-11-29 PROCEDURE — 97110 THERAPEUTIC EXERCISES: CPT

## 2021-11-29 NOTE — FLOWSHEET NOTE
[address] [place] [address] [facility], [place]                                                         Physical Therapy Daily Treatment Note  Date:  2021    Patient Name:  Sandor Quinonez    :  1970  MRN: [id number]    Medical/Treatment Diagnosis Information:  · Diagnosis: M25.561 (ICD-10-CM) - Acute pain of right knee M23.91 (ICD-10-CM) - Internal derangement of right knee  · Treatment Diagnosis: M25.561 (ICD-10-CM) - Acute pain of right knee  Insurance/Certification information:  PT Insurance Information: [place], 60PT, no auth, $0CP  Physician Information:  Referring Practitioner: Tabitha Talbot  Has the plan of care been signed (Y/N):        []  Yes  [x]  No     Date of Patient follow up with Physician: Not scheduled      Is this a Progress Report:     []  Yes  [x]  No        If Yes:  Date Range for reporting period:  Beginning  Ending    Progress report will be due (10 Rx or 30 days whichever is less):        Recertification will be due (POC Duration  / 90 days whichever is less):          Visit # Insurance Allowable Auth Required   1 [place], 60PT []  Yes [x]  No        Functional Scale: LEFS 45%    Date assessed: 21     Latex Allergy:  [x]NO      []YES  Preferred Language for Healthcare:   [x]English       []other:    Pain level: 0/10 at rest    SUBJECTIVE:  See eval    OBJECTIVE: See eval   Observation:    Test measurements:      Flexibility L R Comment   Hamstring      Gastroc      ITB      Quad                ROM PROM AROM Overpressure Comment    L R L R L R    Flexion          Extension                                  Strength L R Comment   Quad      Hamstring      Gastroc      Hip flexor      Hip ABD                      Special Test Results/Comment   Meniscal Click    Crepitus    Flexion Test    Valgus Laxity    Varus Laxity    Lachmans    Drop Back    Homans            Girth L R   Mid Patella     Suprapatellar for proper function of core, proximal hip and LE with self care and ADLs  [] (52012) Gait Re-education- Provided training and instruction to the patient for proper LE, core and proximal hip recruitment and positioning and eccentric body weight control with ambulation re-education including up and down stairs     Home Exercise Program:    [x] (92590) Reviewed/Progressed HEP activities related to strengthening, flexibility, endurance, ROM of core, proximal hip and LE for functional self-care, mobility, lifting and ambulation/stair navigation   [] (25102)Reviewed/Progressed HEP activities related to improving balance, coordination, kinesthetic sense, posture, motor skill, proprioception of core, proximal hip and LE for self care, mobility, lifting, and ambulation/stair navigation      Manual Treatments:  PROM / STM / Oscillations-Mobs:  G-I, II, III, IV (PA's, Inf., Post.)  [] (80467) Provided manual therapy to mobilize LE, proximal hip and/or LS spine soft tissue/joints for the purpose of modulating pain, promoting relaxation,  increasing ROM, reducing/eliminating soft tissue swelling/inflammation/restriction, improving soft tissue extensibility and allowing for proper ROM for normal function with self care, mobility, lifting and ambulation. Modalities:      Charges:  Timed Code Treatment Minutes: 25   Total Treatment Minutes: 45     [x] EVAL (LOW) 97505   [] EVAL (MOD) 73614   [] EVAL (HIGH) 71166   [] RE-EVAL   [x] DW(47332) x 1    [] IONTO  [] NMR (59640) x     [] VASO  [] Manual (52696) x      [] Other:  [x] TA x   1   [] Mech Traction (93814)  [] ES(attended) (03413)      [] ES (un) (00957):       HEP instruction: Access Code: [email or web address]  URL: [email or web address]  Date: 11/29/2021  Prepared by: Gomez Zacarias    Exercises  Standing Gastroc Stretch on Step - 1 x daily - 7 x weekly - 3 sets - 30 sec hold  Supine Active Straight Leg Raise - 1 x daily - 7 x weekly - 3 sets - 10 reps  Sidelying Hip Abduction - 1 x daily - 7 x weekly - 3 sets - 10 reps  Sitting Heel Slide with Towel - 1 x daily - 7 x weekly - 10 reps - 10 sec hold  Standing Terminal Knee Extension with Resistance - 1 x daily - 7 x weekly - 3 sets - 10 reps  Single Leg Stance with Support - 1 x daily - 7 x weekly - 3 sets - 30 sec hold      GOALS:  Patient stated goal: get back to tennis and working out  [] Progressing: [] Met: [] Not Met: [] Adjusted    Therapist goals for Patient:   Short Term Goals: To be achieved in: 2 weeks  1. Independent in HEP and progression per patient tolerance, in order to prevent re-injury. [] Progressing: [] Met: [] Not Met: [] Adjusted   2. Patient will have a decrease in pain to facilitate improvement in movement, function, and ADLs as indicated by Functional Deficits. [] Progressing: [] Met: [] Not Met: [] Adjusted    Long Term Goals: To be achieved in: 4-6 weeks  1. Disability index score of 22% or less for the LEFS to assist with reaching prior level of function. [] Progressing: [] Met: [] Not Met: [] Adjusted  2. Patient will demonstrate increased AROM of knee flexion to 140deg or greater to allow for proper joint functioning as indicated by patients Functional Deficits. [] Progressing: [] Met: [] Not Met: [] Adjusted  3. Patient will demonstrate an increase in Strength of hamstring, quadricep, and hip abductors to  to allow for proper functional mobility as indicated by patients Functional Deficits. [] Progressing: [] Met: [] Not Met: [] Adjusted  4. Patient will return to running, cutting and jumping activities demo'd in the clinic without increased symptoms or restriction to be able to return to tennis. [] Progressing: [] Met: [] Not Met: [] Adjusted     Overall Progression Towards Functional goals/ Treatment Progress Update:  [] Patient is progressing as expected towards functional goals listed. [] Progression is slowed due to complexities/Impairments listed.   [] Progression has been slowed due to co-morbidities. [x] Plan just implemented, too soon to assess goals progression <30days   [] Goals require adjustment due to lack of progress  [] Patient is not progressing as expected and requires additional follow up with physician  [] Other    Prognosis for POC: [x] Good [] Fair  [] Poor      Patient requires continued skilled intervention: [x] Yes  [] No    Treatment/Activity Tolerance:  [x] Patient able to complete treatment  [] Patient limited by fatigue  [] Patient limited by pain     [] Patient limited by other medical complications  [] Other: Pt able to complete exercises with proper form, VC required for control of exercises. Pt noted mild inc pain during TKE with dec in pain following ed provided for slow and controlling the motion. Continue to progress as tolerated. Return to Play: (if applicable)   []  Stage 1: Intro to Strength   []  Stage 2: Return to Run and Strength   []  Stage 3: Return to Jump and Strength   []  Stage 4: Dynamic Strength and Agility   []  Stage 5: Sport Specific Training     []  Ready to Return to Play, Meets All Above Stages   []  Not Ready for Return to Sports   Comments:                               PLAN: See eval  [] Continue per plan of care [] Alter current plan (see comments above)  [x] Plan of care initiated [] Hold pending MD visit [] Discharge  Note: If patient does not return for scheduled/ recommended follow up visits, this note will serve as a discharge from care along with most recent update on progress. Reviewed insurance benefits for physical therapy in an outpatient hospital based setting with the patient, including deductible  and allowable visit number. Pt was informed of possible out of pocket costs, as well as, informed of other service options for continuing supervised sessions without required skilled PT intervention such as the [facility] program.     Electronically signed by:    Avtar Kim SPT  Therapist was present, directed the patient's care, made skilled judgement, and was responsible for assessment and treatment of the patient. Patient was in agreement to be treated by student physical therapist with licensed physical therapist present.      Isaac Smith, PT, DPT, Cert DN

## 2021-11-29 NOTE — PLAN OF CARE
[facility], [address] [place], [address]     Phone: [phone]   Fax: [phone]                                                           Physical Therapy Certification    Dear Referring Practitioner: Carmen Escamilla,    We had the pleasure of evaluating the following patient for physical therapy services at [address] [place] [address]. A summary of our findings can be found in the initial assessment below. This includes our plan of care. If you have any questions or concerns regarding these findings, please do not hesitate to contact me at the office phone number checked above. Thank you for the referral.       Physician Signature:_______________________________Date:__________________  By signing above (or electronic signature), therapists plan is approved by physician      Patient: Donovan Veloz   : 1970   MRN: [id number]  Referring Physician: Referring Practitioner: Beth      Evaluation Date: 2021      Medical Diagnosis Information:  Diagnosis: M25.561 (ICD-10-CM) - Acute pain of right knee M23.91 (ICD-10-CM) - Internal derangement of right knee   Treatment Diagnosis: M25.561 (ICD-10-CM) - Acute pain of right knee                                           Precautions/ Contra-indications:   Latex Allergy:  [x]NO      []YES  Preferred Language for Healthcare:   [x]English       []Other:    C-SSRS Triggered by Intake questionnaire (Past 2 wk assessment):   [x] No, Questionnaire did not trigger screening.   [] Yes, Patient intake triggered further evaluation      [] C-SSRS Screening completed  [] PCP notified via Plan of Care  [] Emergency services notified         SUBJECTIVE: Pt began feeling pain immediately after swinging tennis racket with twisting. She didn't notice any swelling. She states it has been getting better.  Beginning last week, the pain hasn't been as bad and only notices a sharp shooting pain when she actively flexes her knee. She has 0/10 pain at rest and pain can get to 7-8/10 at its worst. She does notice increased stiffness when standing or sitting for >1 hour. She has tried the elliptical for 20 minutes without any pain. Steps were initially really painful, but she hasn't noticed much pain with this since. Relevant Medical History: Not significant  Functional Disability Index:LEFS    Pain Scale: 0/10 at rest  Easing factors: Resting, ice, Advil  Provocative factors: knee flexion, quad activation    Type: []Constant   [x]Intermittent  []Radiating []Localized []other:     Numbness/Tingling: Pt denies    Functional Limitations/Impairments: []Sitting []Standing []Walking    [x]Squatting/bending  []Stairs           []ADL's  []Transfers [x]Sports/Recreation []Other:    Occupation/School: [employer]    Living Status/Prior Level of Function: Independent with ADLs and IADLs, Tennis, working out  (insert highest prior level of function)    OBJECTIVE:     Joint mobility:    []Normal    []Hypo   [x]Hyper    Palpation: No TTP lateral, Mild TTP on medial aspect     Functional Mobility/Transfers: IND    Posture:  Forward head, rounded shoulders    Bandages/Dressings/Incisions: IND    Gait: (include devices/WB status) IND        Flexibility L R Comment   Hamstring neutral neutral 90/90 test   Gastroc      ITB - obers - obers    Quad                ROM PROM AROM Overpressure Comment    L R L R L R    Flexion   142 132      Extension   3 deg hyper 3 deg hyper                              Strength L R Comment   Quad 4+ 4-    Hamstring 4 4-    Gastroc 4+ 4+    Hip flexor 4 4    Hip ABD 4 4-                  Orthopedic Special Tests:   Special Test Results/Comment   Meniscal Click    Crepitus    Flexion Test    Valgus Laxity    Varus Laxity    Lachmans    Drop Back    Homans            Girth L R   Mid Patella     Suprapatellar     5cm above     15cm above                              [x] Patient history, allergies, meds reviewed. Medical chart reviewed. See intake form. Review Of Systems (ROS):  [x]Performed Review of systems (Integumentary, CardioPulmonary, Neurological) by intake and observation. Intake form has been scanned into medical record. Patient has been instructed to contact their primary care physician regarding ROS issues if not already being addressed at this time. Co-morbidities/Complexities (which will affect course of rehabilitation):  [x]None           Arthritic conditions   []Rheumatoid arthritis (M05.9)  []Osteoarthritis (M19.91)   Cardiovascular conditions   []Hypertension (I10)  []Hyperlipidemia (E78.5)  []Angina pectoris (I20)  []Atherosclerosis (I70)   Musculoskeletal conditions   []Disc pathology   []Congenital spine pathologies   []Prior surgical intervention  []Osteoporosis (M81.8)  []Osteopenia (M85.8)   Endocrine conditions   []Hypothyroid (E03.9)  []Hyperthyroid Gastrointestinal conditions   []Constipation (G15.52)   Metabolic conditions   []Morbid obesity (E66.01)  []Diabetes type 1(E10.65) or 2 (E11.65)   []Neuropathy (G60.9)     Pulmonary conditions   []Asthma (J45)  []Coughing   []COPD (J44.9)   Psychological Disorders  []Anxiety (F41.9)  []Depression (F32.9)   []Other:   []Other:          Barriers to/and or personal factors that will affect rehab potential:              []Age  []Sex              []Motivation/Lack of Motivation                        []Co-Morbidities              []Cognitive Function, education/learning barriers              []Environmental, home barriers              []profession/work barriers  []past PT/medical experience  []other:    Falls Risk Assessment (30 days):  [x] Falls Risk assessed and no intervention required.   [] Falls Risk assessed and Patient requires intervention due to being higher risk   TUG score (>12s at risk):     [] Falls education provided, including           Functional Assessment:   Functional Assessment scale used: LEFS  Score: Potential:      []Excellent   [x]Good    []Fair   []Poor    Tolerance of evaluation/treatment:    []Excellent   [x]Good    []Fair   []Poor    PLAN  Frequency/Duration:  1-2 days per week for 4-6 Weeks:  Interventions:  [x]  Therapeutic exercise including: strength training, ROM, for Lower extremity and core   [x]  NMR activation and proprioception for LE, Glutes and Core   [x]  Manual therapy as indicated for LE, Hip and spine to include: Dry Needling/IASTM, STM, PROM, Gr I-IV mobilizations, manipulation. [x] Modalities as needed that may include: thermal agents, E-stim, Biofeedback, US, iontophoresis as indicated  [x] Patient education on joint protection, postural re-education, activity modification, progression of HEP. HEP instruction: Access Code: [email or web address]  URL: [email or web address]  Date: 11/29/2021  Prepared by: Momo Velasco    Exercises  Standing Gastroc Stretch on Step - 1 x daily - 7 x weekly - 3 sets - 30 sec hold  Supine Active Straight Leg Raise - 1 x daily - 7 x weekly - 3 sets - 10 reps  Sidelying Hip Abduction - 1 x daily - 7 x weekly - 3 sets - 10 reps  Sitting Heel Slide with Towel - 1 x daily - 7 x weekly - 10 reps - 10 sec hold  Standing Terminal Knee Extension with Resistance - 1 x daily - 7 x weekly - 3 sets - 10 reps  Single Leg Stance with Support - 1 x daily - 7 x weekly - 3 sets - 30 sec hold      GOALS:  Patient stated goal: get back to tennis and working out  [] Progressing: [] Met: [] Not Met: [] Adjusted    Therapist goals for Patient:   Short Term Goals: To be achieved in: 2 weeks  1. Independent in HEP and progression per patient tolerance, in order to prevent re-injury. [] Progressing: [] Met: [] Not Met: [] Adjusted   2. Patient will have a decrease in pain to facilitate improvement in movement, function, and ADLs as indicated by Functional Deficits. [] Progressing: [] Met: [] Not Met: [] Adjusted    Long Term Goals: To be achieved in: 4-6 weeks  1. Disability index score of 22% or less for the LEFS to assist with reaching prior level of function. [] Progressing: [] Met: [] Not Met: [] Adjusted  2. Patient will demonstrate increased AROM of knee flexion to 140deg or greater to allow for proper joint functioning as indicated by patients Functional Deficits. [] Progressing: [] Met: [] Not Met: [] Adjusted  3. Patient will demonstrate an increase in Strength of hamstring, quadricep, and hip abductors to 4+/5 to allow for proper functional mobility as indicated by patients Functional Deficits. [] Progressing: [] Met: [] Not Met: [] Adjusted  4. Patient will return to running, cutting and jumping activities demo'd in the clinic without increased symptoms or restriction to be able to return to tennis. [] Progressing: [] Met: [] Not Met: [] Adjusted     Physical Therapy Evaluation Complexity Justification  [] A history of present problem with:  [x] no personal factors and/or comorbidities that impact the plan of care;  []1-2 personal factors and/or comorbidities that impact the plan of care  []3 personal factors and/or comorbidities that impact the plan of care  [] An examination of body systems using standardized tests and measures addressing any of the following: body structures and functions (impairments), activity limitations, and/or participation restrictions;:  [] a total of 1-2 or more elements   [x] a total of 3 or more elements   [] a total of 4 or more elements   [] A clinical presentation with:  [x] stable and/or uncomplicated characteristics   [] evolving clinical presentation with changing characteristics  [] unstable and unpredictable characteristics;   [] Clinical decision making of [x] low, [] moderate, [] high complexity using standardized patient assessment instrument and/or measurable assessment of functional outcome.     [x] EVAL (LOW) 75085 (typically 20 minutes face-to-face)  [] EVAL (MOD) 28579 (typically 30 minutes face-to-face)  [] EVAL (HIGH) 79441 (typically 45 minutes face-to-face)  [] RE-EVAL 34452  Electronically signed by: Kaylin Rizvi, SPT  Therapist was present, directed the patient's care, made skilled judgement, and was responsible for assessment and treatment of the patient. Patient was in agreement to be treated by student physical therapist with licensed physical therapist present.      Pream Barajas, PT, DPT, Cert DN

## 2021-12-06 ENCOUNTER — HOSPITAL ENCOUNTER (OUTPATIENT)
Dept: PHYSICAL THERAPY | Age: 51
Setting detail: THERAPIES SERIES
Discharge: HOME | End: 2021-12-06
Payer: COMMERCIAL

## 2021-12-06 PROCEDURE — 97530 THERAPEUTIC ACTIVITIES: CPT

## 2021-12-06 PROCEDURE — 97110 THERAPEUTIC EXERCISES: CPT

## 2021-12-06 PROCEDURE — 97112 NEUROMUSCULAR REEDUCATION: CPT

## 2021-12-06 NOTE — FLOWSHEET NOTE
Flexion          Extension                                  Strength L R Comment   Quad      Hamstring      Gastroc      Hip flexor      Hip ABD                      Special Test Results/Comment   Meniscal Click    Crepitus    Flexion Test    Valgus Laxity    Varus Laxity    Lachmans    Drop Back    Homans            Girth L R   Mid Patella     Suprapatellar     5cm above     15cm above            RESTRICTIONS/PRECAUTIONS:     Exercises/Interventions:     Exercise/Equipment Resistance/Repetitions Other comments   Stretching     Hamstring     Hip Flexion     ITB     Grion     Quad     Inclined Calf [id number]            SLR     Supine 3x10, 1# cuff weight    Prone     Abduction 3x10, 1# cuff weight    Adducton 2x10    SLR+     Clams                    Isometrics     Quad sets     Hip Adduction     Hamstring                    Patellar Glides     Medial     Superior     Inferior          ROM     Sheet Pulls 10x10    Wall Slides     Edge of bed     Flexionator     Extensionator     Hang Weights     Ankle Pumps                              CKC     Calf raises     Wall sits [id number]    Step ups     Step up and over     Lateral Step Downs 3x10, L1              Mini squats     CC TKE 3x10, 4 plates         Lateral band walks     Side Planks     Half moon     Single leg flow          Biodex-balance     Single leg stance [id number], airex    Plyoback          Stool scoots     SB bridge     HS Curls Standing, 3x10, no resistance    Planks          PRE     Extension  RANGE: 90/40   Flexion  RANGE: 0/90        Cable Column          Leg Press  RANGE: 70/10        Bike 5', no resistance    Treadmill                     Therapeutic Exercise and NMR EXR  [] (04508) Provided verbal/tactile cueing for activities related to strengthening, flexibility, endurance, ROM for improvements in LE, proximal hip, and core control with self care, mobility, lifting, ambulation.  [] (79817) Provided verbal/tactile cueing for activities related to improving balance, coordination, kinesthetic sense, posture, motor skill, proprioception  to assist with LE, proximal hip, and core control in self care, mobility, lifting, ambulation and eccentric single leg control. NMR and Therapeutic Activities:    [] (30849 or 32967) Provided verbal/tactile cueing for activities related to improving balance, coordination, kinesthetic sense, posture, motor skill, proprioception and motor activation to allow for proper function of core, proximal hip and LE with self care and ADLs  [] (20044) Gait Re-education- Provided training and instruction to the patient for proper LE, core and proximal hip recruitment and positioning and eccentric body weight control with ambulation re-education including up and down stairs     Home Exercise Program:    [x] (64808) Reviewed/Progressed HEP activities related to strengthening, flexibility, endurance, ROM of core, proximal hip and LE for functional self-care, mobility, lifting and ambulation/stair navigation   [] (65533)Reviewed/Progressed HEP activities related to improving balance, coordination, kinesthetic sense, posture, motor skill, proprioception of core, proximal hip and LE for self care, mobility, lifting, and ambulation/stair navigation      Manual Treatments:  PROM / STM / Oscillations-Mobs:  G-I, II, III, IV (PA's, Inf., Post.)  [] (50210) Provided manual therapy to mobilize LE, proximal hip and/or LS spine soft tissue/joints for the purpose of modulating pain, promoting relaxation,  increasing ROM, reducing/eliminating soft tissue swelling/inflammation/restriction, improving soft tissue extensibility and allowing for proper ROM for normal function with self care, mobility, lifting and ambulation.      Modalities:      Charges:  Timed Code Treatment Minutes: 42   Total Treatment Minutes: 45     [] EVAL (LOW) 11836   [] EVAL (MOD) 94025   [] EVAL (HIGH) 05903   [] RE-EVAL   [x] UQ(82802) x 1    [] IONTO  [x] NMR (42818) x 1    [] VASO  [] Manual (19050) x      [] Other:  [x] TA x   1   [] Mech Traction (85107)  [] ES(attended) (59071)      [] ES (un) (71745):       HEP instruction: Access Code: [email or web address]  URL: [email or web address]  Date: 12/06/2021  Prepared by: Nathaly Arce    Exercises  Standing Gastroc Stretch on Step - 1 x daily - 7 x weekly - 3 sets - 30 sec hold  Supine Active Straight Leg Raise - 1 x daily - 7 x weekly - 3 sets - 10 reps  Sidelying Hip Abduction - 1 x daily - 7 x weekly - 3 sets - 10 reps  Sitting Heel Slide with Towel - 1 x daily - 7 x weekly - 10 reps - 10 sec hold  Standing Terminal Knee Extension with Resistance - 1 x daily - 7 x weekly - 3 sets - 10 reps  Sidelying Hip Adduction - 1 x daily - 7 x weekly - 2 sets - 10 reps  Wall Quarter Squat - 1 x daily - 7 x weekly - 3 sets - 30 sec hold  Lateral Step Down - 1 x daily - 7 x weekly - 3 sets - 10 reps  Standing Hamstring Curl with Resistance - 1 x daily - 7 x weekly - 3 sets - 10 reps  Single Leg Stance on Foam Pad - 1 x daily - 7 x weekly - 3 sets - 30 sec hold      GOALS:  Patient stated goal: get back to tennis and working out  [] Progressing: [] Met: [] Not Met: [] Adjusted    Therapist goals for Patient:   Short Term Goals: To be achieved in: 2 weeks  1. Independent in HEP and progression per patient tolerance, in order to prevent re-injury. [] Progressing: [] Met: [] Not Met: [] Adjusted   2. Patient will have a decrease in pain to facilitate improvement in movement, function, and ADLs as indicated by Functional Deficits. [] Progressing: [] Met: [] Not Met: [] Adjusted    Long Term Goals: To be achieved in: 4-6 weeks  1. Disability index score of 22% or less for the LEFS to assist with reaching prior level of function. [] Progressing: [] Met: [] Not Met: [] Adjusted  2. Patient will demonstrate increased AROM of knee flexion to 140deg or greater to allow for proper joint functioning as indicated by patients Functional Deficits.     [] Progressing: [] Met: [] Not Met: [] Adjusted  3. Patient will demonstrate an increase in Strength of hamstring, quadricep, and hip abductors to  to allow for proper functional mobility as indicated by patients Functional Deficits. [] Progressing: [] Met: [] Not Met: [] Adjusted  4. Patient will return to running, cutting and jumping activities demo'd in the clinic without increased symptoms or restriction to be able to return to tennis. [] Progressing: [] Met: [] Not Met: [] Adjusted     Overall Progression Towards Functional goals/ Treatment Progress Update:  [] Patient is progressing as expected towards functional goals listed. [] Progression is slowed due to complexities/Impairments listed. [] Progression has been slowed due to co-morbidities. [x] Plan just implemented, too soon to assess goals progression <30days   [] Goals require adjustment due to lack of progress  [] Patient is not progressing as expected and requires additional follow up with physician  [] Other    Prognosis for POC: [x] Good [] Fair  [] Poor      Patient requires continued skilled intervention: [x] Yes  [] No    Treatment/Activity Tolerance:  [x] Patient able to complete treatment  [] Patient limited by fatigue  [] Patient limited by pain     [] Patient limited by other medical complications  [] Other: Pt progressing WB activities today to include wall sits, lateral step downs, and HS curls. Pt demo'd good control throughout tasks. Pt did state she was having mild ant knee pain during SLR and new addition of SL adduction. Education provided regarding not completing too forceful of quad contraction to avoid hyperextension of the knee during tasks, pt demo'ing understanding. Continue to progress as tolerated.           Return to Play: (if applicable)   []  Stage 1: Intro to Strength   []  Stage 2: Return to Run and Strength   []  Stage 3: Return to Jump and Strength   []  Stage 4: Dynamic Strength and Agility   []  Stage 5: Sport Specific Training     []  Ready to Return to Play, Meets All Above Stages   []  Not Ready for Return to Sports   Comments:                               PLAN: See eval, consider elliptical at [facility]  [] Continue per plan of care [] Alter current plan (see comments above)  [x] Plan of care initiated [] Hold pending MD visit [] Discharge  Note: If patient does not return for scheduled/ recommended follow up visits, this note will serve as a discharge from care along with most recent update on progress. Reviewed insurance benefits for physical therapy in an outpatient hospital based setting with the patient, including deductible  and allowable visit number. Pt was informed of possible out of pocket costs, as well as, informed of other service options for continuing supervised sessions without required skilled PT intervention such as the cash based [employer] program.     Electronically signed by: Keo Saldivar, SPT  Therapist was present, directed the patient's care, made skilled judgement, and was responsible for assessment and treatment of the patient. Patient was in agreement to be treated by student physical therapist with licensed physical therapist present.      Kiley Crespo, PT, DPT, Cert DN

## 2021-12-14 ENCOUNTER — HOSPITAL ENCOUNTER (OUTPATIENT)
Dept: PHYSICAL THERAPY | Age: 51
Setting detail: THERAPIES SERIES
Discharge: HOME | End: 2021-12-14
Payer: COMMERCIAL

## 2021-12-14 PROCEDURE — 97112 NEUROMUSCULAR REEDUCATION: CPT

## 2021-12-14 PROCEDURE — 97530 THERAPEUTIC ACTIVITIES: CPT

## 2021-12-14 PROCEDURE — 97110 THERAPEUTIC EXERCISES: CPT

## 2021-12-14 NOTE — FLOWSHEET NOTE
[address] [place] [address] [facility], [place]                                                         Physical Therapy Daily Treatment Note  Date:  2021    Patient Name:  Payton Riddle    :  1970  MRN: [id number]    Medical/Treatment Diagnosis Information:  · Diagnosis: M25.561 (ICD-10-CM) - Acute pain of right knee M23.91 (ICD-10-CM) - Internal derangement of right knee  · Treatment Diagnosis: M25.561 (ICD-10-CM) - Acute pain of right knee  Insurance/Certification information:  PT Insurance Information: [employer], 60PT, no auth, $0CP  Physician Information:  Referring Practitioner: Vincenzo Galvan  Has the plan of care been signed (Y/N):        []  Yes  [x]  No     Date of Patient follow up with Physician: 21    Is this a Progress Report:     []  Yes  [x]  No        If Yes:  Date Range for reporting period:  Beginning  Ending    Progress report will be due (10 Rx or 30 days whichever is less):        Recertification will be due (POC Duration  / 90 days whichever is less):          Visit # Insurance Allowable Auth Required   3 [employer], 60PT []  Yes [x]  No        Functional Scale: LEFS 45%    Date assessed: 21     Latex Allergy:  [x]NO      []YES  Preferred Language for Healthcare:   [x]English       []other:    Pain level: 0/10 at rest, 2/10 with knee flexion at most    SUBJECTIVE:  Pt states she feels pretty good but notices sometimes the knee feels a little stiff or unstable, but not that it's going to give out. She reports having tried a few lunges and squats without any problems, but hasn't tried anything too rigorous yet.        OBJECTIVE: See eval   Observation:    Test measurements:      Flexibility L R Comment   Hamstring      Gastroc      ITB      Quad                ROM PROM AROM Overpressure Comment    L R L R L R    Flexion  133 with sheet pulls, mild discomfort at end range        Extension Strength L R Comment   Quad      Hamstring      Gastroc      Hip flexor      Hip ABD                      Special Test Results/Comment   Meniscal Click    Crepitus    Flexion Test    Valgus Laxity    Varus Laxity    Lachmans    Drop Back    Homans            Girth L R   Mid Patella     Suprapatellar     5cm above     15cm above            RESTRICTIONS/PRECAUTIONS:     Exercises/Interventions:     Exercise/Equipment Resistance/Repetitions Other comments   Stretching     Hamstring     Hip Flexion     ITB     Grion     Quad     Inclined Calf [id number]            SLR     Supine 3x10, 2# cuff weight    Prone        Adducton 3x10, 1# cuff weight    SLR+     Clams                    Isometrics     Quad sets     Hip Adduction     Hamstring                    Patellar Glides     Medial     Superior     Inferior          ROM     Sheet Pulls 10x10    Wall Slides     Edge of bed     Flexionator     Extensionator     Hang Weights     Ankle Pumps                              CKC     Calf raises        Cone taps 2 cones, 3x10 VC for hip flexion   Step ups     Step up and over        Circuit x3 x30sec wall sit  x1 short lap lateral green band walks  x10 lateral step downs         Mini squats     CC TKE 3x10, 5 plates         Lateral band walks     Side Planks     Half moon     Single leg flow          Biodex-balance     Single leg stance [id number], airex 2# ball toss on rebounder    Plyoback     Rockerboard 2x10 mini squats  x10 mini squats with green band to avoid medial knee collapse    Stool scoots     SB bridge        Planks          PRE     Extension  RANGE: 90/40   Flexion  RANGE: 0/90        Cable Column          Leg Press  RANGE: 70/10           Treadmill     Elliptical 8', L3               Therapeutic Exercise and NMR EXR  [] (40100) Provided verbal/tactile cueing for activities related to strengthening, flexibility, endurance, ROM for improvements in LE, proximal hip, and core control with self care, mobility, lifting, ambulation.  [] (10758) Provided verbal/tactile cueing for activities related to improving balance, coordination, kinesthetic sense, posture, motor skill, proprioception  to assist with LE, proximal hip, and core control in self care, mobility, lifting, ambulation and eccentric single leg control. NMR and Therapeutic Activities:    [] (06629 or 84863) Provided verbal/tactile cueing for activities related to improving balance, coordination, kinesthetic sense, posture, motor skill, proprioception and motor activation to allow for proper function of core, proximal hip and LE with self care and ADLs  [] (54518) Gait Re-education- Provided training and instruction to the patient for proper LE, core and proximal hip recruitment and positioning and eccentric body weight control with ambulation re-education including up and down stairs     Home Exercise Program:    [x] (36106) Reviewed/Progressed HEP activities related to strengthening, flexibility, endurance, ROM of core, proximal hip and LE for functional self-care, mobility, lifting and ambulation/stair navigation   [] (71636)Reviewed/Progressed HEP activities related to improving balance, coordination, kinesthetic sense, posture, motor skill, proprioception of core, proximal hip and LE for self care, mobility, lifting, and ambulation/stair navigation      Manual Treatments:  PROM / STM / Oscillations-Mobs:  G-I, II, III, IV (PA's, Inf., Post.)  [] (91400) Provided manual therapy to mobilize LE, proximal hip and/or LS spine soft tissue/joints for the purpose of modulating pain, promoting relaxation,  increasing ROM, reducing/eliminating soft tissue swelling/inflammation/restriction, improving soft tissue extensibility and allowing for proper ROM for normal function with self care, mobility, lifting and ambulation.      Modalities:      Charges:  Timed Code Treatment Minutes: 51   Total Treatment Minutes: 55     [] EVAL (LOW) 05819   [] EVAL (MOD) 71590   [] EVAL (HIGH) 01153   [] RE-EVAL   [x] AM(27455) x 1    [] IONTO  [x] NMR (04023) x 1    [] VASO  [] Manual (20078) x      [] Other:  [x] TA x   1   [] Mech Traction (94471)  [] ES(attended) (64371)      [] ES (un) (80008):       HEP instruction: Access Code: [email or web address]  URL: [email or web address]  Date: 12/06/2021  Prepared by: Julissa Salgado    Exercises  Standing Gastroc Stretch on Step - 1 x daily - 7 x weekly - 3 sets - 30 sec hold  Supine Active Straight Leg Raise - 1 x daily - 7 x weekly - 3 sets - 10 reps  Sidelying Hip Abduction - 1 x daily - 7 x weekly - 3 sets - 10 reps  Sitting Heel Slide with Towel - 1 x daily - 7 x weekly - 10 reps - 10 sec hold  Standing Terminal Knee Extension with Resistance - 1 x daily - 7 x weekly - 3 sets - 10 reps  Sidelying Hip Adduction - 1 x daily - 7 x weekly - 2 sets - 10 reps  Wall Quarter Squat - 1 x daily - 7 x weekly - 3 sets - 30 sec hold  Lateral Step Down - 1 x daily - 7 x weekly - 3 sets - 10 reps  Standing Hamstring Curl with Resistance - 1 x daily - 7 x weekly - 3 sets - 10 reps  Single Leg Stance on Foam Pad - 1 x daily - 7 x weekly - 3 sets - 30 sec hold      GOALS:  Patient stated goal: get back to tennis and working out  [] Progressing: [] Met: [] Not Met: [] Adjusted    Therapist goals for Patient:   Short Term Goals: To be achieved in: 2 weeks  1. Independent in HEP and progression per patient tolerance, in order to prevent re-injury. [] Progressing: [] Met: [] Not Met: [] Adjusted   2. Patient will have a decrease in pain to facilitate improvement in movement, function, and ADLs as indicated by Functional Deficits. [] Progressing: [] Met: [] Not Met: [] Adjusted    Long Term Goals: To be achieved in: 4-6 weeks  1. Disability index score of 22% or less for the LEFS to assist with reaching prior level of function. [] Progressing: [] Met: [] Not Met: [] Adjusted  2.  Patient will demonstrate increased AROM of knee flexion to 140deg or greater to allow for proper joint functioning as indicated by patients Functional Deficits. [] Progressing: [] Met: [] Not Met: [] Adjusted  3. Patient will demonstrate an increase in Strength of hamstring, quadricep, and hip abductors to  to allow for proper functional mobility as indicated by patients Functional Deficits. [] Progressing: [] Met: [] Not Met: [] Adjusted  4. Patient will return to running, cutting and jumping activities demo'd in the clinic without increased symptoms or restriction to be able to return to tennis. [] Progressing: [] Met: [] Not Met: [] Adjusted     Overall Progression Towards Functional goals/ Treatment Progress Update:  [] Patient is progressing as expected towards functional goals listed. [] Progression is slowed due to complexities/Impairments listed. [] Progression has been slowed due to co-morbidities. [x] Plan just implemented, too soon to assess goals progression <30days   [] Goals require adjustment due to lack of progress  [] Patient is not progressing as expected and requires additional follow up with physician  [] Other    Prognosis for POC: [x] Good [] Fair  [] Poor      Patient requires continued skilled intervention: [x] Yes  [] No    Treatment/Activity Tolerance:  [x] Patient able to complete treatment  [] Patient limited by fatigue  [] Patient limited by pain     [] Patient limited by other medical complications  [] Other: Pt progressing with increasing WB functional tasks with pt showing improved quadricep control and activation. Pt completed circuit today demoing proper form of exercises. Pt did report mild inc in pain along medial knee during last exercise of mini squats on the rockerboard, green band added for TC to avoid medial knee collapse which improved pain. Educated pt regarding completion of HEP 3-4x/week to avoid overworking LE.  Further discussed with pt ability to use machines at gym and complete circuits, but to avoid any jumping, cutting or hopping at this time. Continue to progress as tolerated. Return to Play: (if applicable)   []  Stage 1: Intro to Strength   []  Stage 2: Return to Run and Strength   []  Stage 3: Return to Jump and Strength   []  Stage 4: Dynamic Strength and Agility   []  Stage 5: Sport Specific Training     []  Ready to Return to Play, Meets All Above Stages   []  Not Ready for Return to Sports   Comments:                               PLAN: See eval, consider possible IASTM tools at [facility] if distal HS still sore; consider additional functional tasks for quad strengthening such as lunges; Progress balance training for SL to begin to progress pt back to tennis  [] Continue per plan of care [] Alter current plan (see comments above)  [x] Plan of care initiated [] Hold pending MD visit [] Discharge  Note: If patient does not return for scheduled/ recommended follow up visits, this note will serve as a discharge from care along with most recent update on progress. Reviewed insurance benefits for physical therapy in an outpatient hospital based setting with the patient, including deductible  and allowable visit number. Pt was informed of possible out of pocket costs, as well as, informed of other service options for continuing supervised sessions without required skilled PT intervention such as the cash based [employer] program.     Electronically signed by: Yuriy Cochran, SPT  Therapist was present, directed the patient's care, made skilled judgement, and was responsible for assessment and treatment of the patient. Patient was in agreement to be treated by student physical therapist with licensed physical therapist present.      Shmuel Nj, PT, DPT, Cert DN

## 2021-12-21 ENCOUNTER — APPOINTMENT (OUTPATIENT)
Dept: PHYSICAL THERAPY | Age: 51
End: 2021-12-21
Payer: COMMERCIAL

## 2021-12-23 ENCOUNTER — OFFICE VISIT (OUTPATIENT)
Dept: ORTHOPEDIC SURGERY | Age: 51
End: 2021-12-23
Payer: COMMERCIAL

## 2021-12-23 VITALS — HEIGHT: 65 IN | BODY MASS INDEX: 21.66 KG/M2 | WEIGHT: 130 LBS

## 2021-12-23 DIAGNOSIS — M23.91 INTERNAL DERANGEMENT OF RIGHT KNEE: ICD-10-CM

## 2021-12-23 DIAGNOSIS — M25.561 ACUTE PAIN OF RIGHT KNEE: Primary | ICD-10-CM

## 2021-12-23 PROCEDURE — MISC265 BAUERFEIND GENUTRAIN KNEE SLEEVE: Performed by: ORTHOPAEDIC SURGERY

## 2021-12-23 PROCEDURE — 99212 OFFICE O/P EST SF 10 MIN: CPT | Performed by: ORTHOPAEDIC SURGERY

## 2021-12-23 RX ORDER — IBUPROFEN 200 MG
200 TABLET ORAL EVERY 6 HOURS PRN
COMMUNITY

## 2021-12-23 RX ORDER — LEVOTHYROXINE SODIUM 0.12 MG/1
TABLET ORAL
COMMUNITY
Start: 2021-12-07

## 2021-12-23 NOTE — PROGRESS NOTES
Mariam Dodson returns today to follow-up her right knee. She is doing a lot better she still has some occasional sensation of instability. She feels like she is getting stronger. If she has pain is only 2 or 3 out of 10  General Exam:    Vitals: Height 5' 5\" (1.651 m), weight 130 lb (59 kg). Constitutional: Patient is adequately groomed with no evidence of malnutrition  Mental Status: The patient is oriented to time, place and person. The patient's mood and affect are appropriate. Right knee today has no gross instability or effusion. She has full range of motion. She is good quad tone. Assessment: Improving discomfort after right knee sprain. Plan: I am recommending that she spend the next month working on strengthening and maintaining her fitness before resuming tennis in February. She will try Genutrain knee brace today which I think will help with both support and proprioception. Follow-up with me for any persistent issue. Procedures    [employer] Genutrain Knee Sleeve $60     Patient was supplied a [employer] Genutrain Knee Sleeve. This retail item was supplied to provide functional support and assist in protecting the affected area. Verbal and written instructions for the use of and application of this item were provided. The patient was educated and fit by a healthcare professional with expert knowledge and specialization in brace application. They were instructed to contact the office immediately should the equipment result in increased pain, decreased sensation, increased swelling or worsening of the condition.

## 2021-12-29 ENCOUNTER — HOSPITAL ENCOUNTER (OUTPATIENT)
Dept: PHYSICAL THERAPY | Age: 51
Setting detail: THERAPIES SERIES
Discharge: HOME | End: 2021-12-29
Payer: COMMERCIAL

## 2021-12-29 PROCEDURE — 97112 NEUROMUSCULAR REEDUCATION: CPT

## 2021-12-29 PROCEDURE — 97110 THERAPEUTIC EXERCISES: CPT

## 2021-12-29 PROCEDURE — 97530 THERAPEUTIC ACTIVITIES: CPT

## 2021-12-29 NOTE — PLAN OF CARE
[facility] [address] [place] [address]                                 Physical Therapy Discharge Summary    Dear  Dr. Haydee Michaels,    We had the pleasure of treating the following patient for physical therapy services at [address] [place] [address]. A summary of our findings can be found in the discharge summary below. If you have any questions or concerns regarding these findings, please do not hesitate to contact me at the office phone number above. Thank you for the referral.     Physician Signature:________________________________Date:__________________  By signing above (or electronic signature), therapists plan is approved by physician      Overall Response to Treatment:   [x]Patient is responding well to treatment and improvement is noted with regards  to goals   []Patient should continue to improve in reasonable time if they continue HEP   []Patient has plateaued and is no longer responding to skilled PT intervention    []Patient is getting worse and would benefit from return to referring MD   []Patient unable to adhere to initial POC   [x]Other: Patient has made good progress in range of motion, strength, and functional mobility through this period of skilled physical therapy. Patient states the pain no longer affects her ADLs. Patient with no difficulty during stair negotiation or reintegrating physical activity. Patient has good understanding of HEP and proper body mechanics. The patient has been given updated HEP and educated to call physical therapist if pain or questions arise.      Date range of Visits: 21-21  Total Visits: 4                   Physical Therapy Daily Treatment Note  Date:  2021    Patient Name:  Fili Bateman    :  1970  MRN: [id number]    Medical/Treatment Diagnosis Information:  · Diagnosis: M25.561 (ICD-10-CM) - Acute pain of right knee M23.91 (ICD-10-CM) - Internal derangement of right knee  · Treatment Diagnosis: M25.561 (ICD-10-CM) - Acute pain of right knee  Insurance/Certification information:  PT Insurance Information: Van, 60PT, no auth, $0CP  Physician Information:  Referring Practitioner: Haydee Michaels  Has the plan of care been signed (Y/N):        []  Yes  [x]  No     Date of Patient follow up with Physician: 12/23/21    Is this a Progress Report:     []  Yes  [x]  No        If Yes:  Date Range for reporting period:  Beginning  Ending    Progress report will be due (10 Rx or 30 days whichever is less): 86/43/80       Recertification will be due (POC Duration  / 90 days whichever is less):          Visit # Insurance Allowable Auth Required   4 Van, 60PT []  Yes [x]  No        Functional Scale: LEFS 45%    Date assessed: 11/29/21                                  LEFS 23%                                    Date assessed: 12/29/21    Latex Allergy:  [x]NO      []YES  Preferred Language for Healthcare:   [x]English       []other:    Pain level: 0/10 at rest, 3/10 with knee flexion at most    SUBJECTIVE:  Patient states her knee feels good overall. Pt saw MD last week and states tennis can be resumed in about 2 months. Patient states she is complaint with [facility] and feels comfortable with discharge to [facility] at this time.         OBJECTIVE:    Observation:   Test measurements:    12/29/21   Flexibility L R Comment   Hamstring neutral neutral 90/90 test   Gastroc         ITB - obers - obers     Quad                                   ROM PROM AROM Overpressure Comment     L R L R L R     Flexion     140 145         Extension     3 deg hyper 3 deg hyper                                                   Strength L R Comment   Quad 5 5     Hamstring 4+ 4+     Gastroc 5 5     Hip flexor 4+ 4+     Hip ABD 4+ 4                                  Special Test Results/Comment   Meniscal Click    Crepitus    Flexion Test    Valgus Laxity    Varus Laxity    Lachmans    Drop Back    Homans Girth L R   Mid Patella     Suprapatellar     5cm above     15cm above            RESTRICTIONS/PRECAUTIONS:     Exercises/Interventions:     Exercise/Equipment Resistance/Repetitions Other comments   Stretching     Hamstring     Hip Flexion     ITB     Grion     Quad     Inclined Calf [id number]            SLR     Supine    Prone       Adducton    SLR+     Clams                    Isometrics     Quad sets     Hip Adduction     Hamstring                    Patellar Glides     Medial     Superior     Inferior          ROM     Sheet Pulls     Wall Slides     Edge of bed     Flexionator     Extensionator     Hang Weights     Ankle Pumps                              CKC     Calf raises        Cone taps  VC for hip flexion   Step ups     Step up and over        Circuit x3          Mini squats     CC TKE          Monster walks     Side Planks     Half moon     Single leg flow          Biodex-balance     Single leg stance    Plyoback    Rockerboard    Stool scoots     SL bridge 3x10       Planks     Deadlift 2x10 10# to step         PRE     Extension  RANGE: 90/40   Flexion SL 30# 3x10 RANGE: 0/90        Cable Column          Leg Press  RANGE: 70/10           Treadmill     Elliptical 5', L3               Therapeutic Exercise and NMR EXR  [] (11277) Provided verbal/tactile cueing for activities related to strengthening, flexibility, endurance, ROM for improvements in LE, proximal hip, and core control with self care, mobility, lifting, ambulation.  [] (90191) Provided verbal/tactile cueing for activities related to improving balance, coordination, kinesthetic sense, posture, motor skill, proprioception  to assist with LE, proximal hip, and core control in self care, mobility, lifting, ambulation and eccentric single leg control.      NMR and Therapeutic Activities:    [] (16343 or 71217) Provided verbal/tactile cueing for activities related to improving balance, coordination, kinesthetic sense, posture, motor skill, proprioception and motor activation to allow for proper function of core, proximal hip and LE with self care and ADLs  [] (75641) Gait Re-education- Provided training and instruction to the patient for proper LE, core and proximal hip recruitment and positioning and eccentric body weight control with ambulation re-education including up and down stairs     Home Exercise Program:    [x] (71592) Reviewed/Progressed HEP activities related to strengthening, flexibility, endurance, ROM of core, proximal hip and LE for functional self-care, mobility, lifting and ambulation/stair navigation   [] (52250)Reviewed/Progressed HEP activities related to improving balance, coordination, kinesthetic sense, posture, motor skill, proprioception of core, proximal hip and LE for self care, mobility, lifting, and ambulation/stair navigation      Manual Treatments:  PROM / STM / Oscillations-Mobs:  G-I, II, III, IV (PA's, Inf., Post.)  [] (57574) Provided manual therapy to mobilize LE, proximal hip and/or LS spine soft tissue/joints for the purpose of modulating pain, promoting relaxation,  increasing ROM, reducing/eliminating soft tissue swelling/inflammation/restriction, improving soft tissue extensibility and allowing for proper ROM for normal function with self care, mobility, lifting and ambulation. Modalities:      Charges:  Timed Code Treatment Minutes: 38   Total Treatment Minutes: 38   8601-8209  [] EVAL (LOW) 28472   [] EVAL (MOD) 50972   [] EVAL (HIGH) 81851   [] RE-EVAL   [x] AO(40432) x 1    [] IONTO  [x] NMR (18983) x 1    [] VASO  [] Manual (32658) x      [] Other:  [x] TA x   1   [] Mech Traction (40196)  [] ES(attended) (87614)      [] ES (un) (20283): Access Code: [email or web address]  URL: [email or web address]  Date: 12/29/2021  Prepared by: Rafael Hickey    Exercises  Standing Gastroc Stretch on Step - 1 x daily - 7 x weekly - 3 sets - 30 sec hold  Sidelying Hip Abduction - 1 x daily - 7 x weekly - 3 sets - 10 reps  Sitting Heel Slide with Towel - 1 x daily - 7 x weekly - 10 reps - 10 sec hold  Standing Terminal Knee Extension with Resistance - 1 x daily - 7 x weekly - 3 sets - 10 reps  Wall Quarter Squat - 1 x daily - 7 x weekly - 3 sets - 30 sec hold  Lateral Step Down - 1 x daily - 7 x weekly - 3 sets - 10 reps  Standing Hamstring Curl with Resistance - 1 x daily - 7 x weekly - 3 sets - 10 reps  Single Leg Stance on Foam Pad - 1 x daily - 7 x weekly - 3 sets - 30 sec hold  Single Leg Bridge - 1 x daily - 7 x weekly - 3 sets - 10 reps  Hamstring Curl with Weight Machine - 1 x daily - 7 x weekly - 3 sets - 10 reps  Half Dead Lift with Kettlebell - 1 x daily - 7 x weekly - 3 sets - 10 reps      GOALS:  Patient stated goal: get back to tennis and working out  [] Progressing: [] Met: [x] Not Met: held by MD for 2 more months  [] Adjusted    Therapist goals for Patient:   Short Term Goals: To be achieved in: 2 weeks  1. Independent in HEP and progression per patient tolerance, in order to prevent re-injury. [] Progressing: [x] Met: [] Not Met: [] Adjusted   2. Patient will have a decrease in pain to facilitate improvement in movement, function, and ADLs as indicated by Functional Deficits. [] Progressing: [x] Met: [] Not Met: [] Adjusted    Long Term Goals: To be achieved in: 4-6 weeks  1. Disability index score of 22% or less for the LEFS to assist with reaching prior level of function. [] Progressing: [] Met: [x] Not Met:23% limitation  [] Adjusted  2. Patient will demonstrate increased AROM of knee flexion to 140deg or greater to allow for proper joint functioning as indicated by patients Functional Deficits. [] Progressing: [x] Met: [] Not Met: [] Adjusted  3. Patient will demonstrate an increase in Strength of hamstring, quadricep, and hip abductors to  to allow for proper functional mobility as indicated by patients Functional Deficits. [] Progressing: [x] Met: [] Not Met: [] Adjusted  4.  Patient will return to running, cutting and jumping activities demo'd in the clinic without increased symptoms or restriction to be able to return to tennis. [] Progressing: [] Met: [x] Not Met: not attempted [] Adjusted     Overall Progression Towards Functional goals/ Treatment Progress Update:  [x] Patient is progressing as expected towards functional goals listed. [] Progression is slowed due to complexities/Impairments listed. [] Progression has been slowed due to co-morbidities. [] Plan just implemented, too soon to assess goals progression <30days   [] Goals require adjustment due to lack of progress  [] Patient is not progressing as expected and requires additional follow up with physician  [] Other    Prognosis for POC: [x] Good [] Fair  [] Poor      Patient requires continued skilled intervention: [] Yes  [x] No    Treatment/Activity Tolerance:  [x] Patient able to complete treatment  [] Patient limited by fatigue  [] Patient limited by pain     [] Patient limited by other medical complications  [] Other: see above       Return to Play: (if applicable)   []  Stage 1: Intro to Strength   []  Stage 2: Return to Run and Strength   []  Stage 3: Return to Jump and Strength   []  Stage 4: Dynamic Strength and Agility   []  Stage 5: Sport Specific Training     []  Ready to Return to Play, Meets All Above Stages   []  Not Ready for Return to Sports   Comments:                             PLAN:   [] Continue per plan of care [] Alter current plan (see comments above)  [] Plan of care initiated [] Hold pending MD visit [x] Discharge  Note: If patient does not return for scheduled/ recommended follow up visits, this note will serve as a discharge from care along with most recent update on progress. Reviewed insurance benefits for physical therapy in an outpatient hospital based setting with the patient, including deductible  and allowable visit number.  Pt was informed of possible out of pocket costs, as well as, informed of other service options for continuing supervised sessions without required skilled PT intervention such as the cash based [employer] program.     Electronically signed by: Garcia Montoya, PT, DPT

## 2022-01-24 RX ORDER — MELOXICAM 15 MG/1
TABLET ORAL
Qty: 30 TABLET | Refills: 1 | Status: SHIPPED | OUTPATIENT
Start: 2022-01-24 | End: 2022-07-28

## 2022-04-11 ENCOUNTER — TELEPHONE (OUTPATIENT)
Dept: ORTHOPEDIC SURGERY | Age: 52
End: 2022-04-11

## 2022-04-11 NOTE — TELEPHONE ENCOUNTER
Called patient. Patient complains of right knee pain. She states that it is not urgent and she has not had a new injury. Patient scheduled for 4-14-22.

## 2022-04-11 NOTE — TELEPHONE ENCOUNTER
Appointment Request     Patient requesting earlier appointment: Yes  Appointment offered to patient: N/A  Patient Contact Number: [phone]

## 2022-04-14 ENCOUNTER — OFFICE VISIT (OUTPATIENT)
Dept: ORTHOPEDIC SURGERY | Age: 52
End: 2022-04-14
Payer: COMMERCIAL

## 2022-04-14 VITALS — BODY MASS INDEX: 21.66 KG/M2 | HEIGHT: 65 IN | WEIGHT: 130 LBS

## 2022-04-14 DIAGNOSIS — M25.561 ACUTE PAIN OF RIGHT KNEE: Primary | ICD-10-CM

## 2022-04-14 PROCEDURE — 99212 OFFICE O/P EST SF 10 MIN: CPT | Performed by: ORTHOPAEDIC SURGERY

## 2022-04-14 NOTE — PROGRESS NOTES
Pee Robb presents today for reevaluation of her right knee. She had a partial ACL injury and rehab and did very well. She is back doing almost all of her activities with the exception of tennis. She does not have full confidence in her knee but day-to-day activities are entirely pain-free. Her only time noticing pain is when she goes into a deep child's pose doing yoga. She has not noticed swelling and has not had episodes of instability. General Exam:    Vitals: Height 5' 5\" (1.651 m), weight 130 lb (59 kg). Constitutional: Patient is adequately groomed with no evidence of malnutrition  Mental Status: The patient is oriented to time, place and person. The patient's mood and affect are appropriate. Examination of the bilateral hips reveals normal flexion and extension. There is no restriction in rotation. There is no tenderness to palpation anteriorly posteriorly or laterally. Left knee examination demonstrates no effusion. There is no tenderness to palpation over the medial or lateral joint line. There is no discomfort over the patellar tendon. There is no palpable popliteal cyst.  Sensation is intact. Range of motion is normal.  There is no patellofemoral crepitation. There is no instability to varus or valgus stress applied at 0, 30, 60, or 90Â° of flexion. There is no anterior or posterior drawer. Lachman examination is normal.    Right knee today has no effusion. I cannot provoke joint line tenderness. She has no gross instability although her Lachman does show a soft endpoint. She has good quad tone. She has symmetric motion to the other knee. At this point actually think she is doing very well. She can do just about every activity and certainly is not struggling with day-to-day activities. We discussed red flag warning such as swelling, limping, or pain with activities of daily living. I think it is okay for her to advance activities as tolerated. She agrees. Follow-up with me on an as-needed basis.

## 2022-07-28 ENCOUNTER — OFFICE VISIT (OUTPATIENT)
Dept: ORTHOPEDIC SURGERY | Age: 52
End: 2022-07-28
Payer: COMMERCIAL

## 2022-07-28 VITALS — WEIGHT: 130 LBS | HEIGHT: 65 IN | RESPIRATION RATE: 12 BRPM | BODY MASS INDEX: 21.66 KG/M2

## 2022-07-28 DIAGNOSIS — S76.311A HAMSTRING STRAIN, RIGHT, INITIAL ENCOUNTER: Primary | ICD-10-CM

## 2022-07-28 PROCEDURE — 99213 OFFICE O/P EST LOW 20 MIN: CPT | Performed by: ORTHOPAEDIC SURGERY

## 2022-07-28 RX ORDER — METHYLPREDNISOLONE 4 MG/1
TABLET ORAL
Qty: 1 KIT | Refills: 0 | Status: SHIPPED | OUTPATIENT
Start: 2022-07-28

## 2022-07-28 NOTE — PROGRESS NOTES
Sandor Quinonez is seen today for an injury involving her right hamstring. 1 week ago she was playing tennis and felt a pop in her right medial hamstrings the mid muscle belly. She noticed that she was already sore after having played very hard the week before. Currently she complains of pain is 2 out of 10 at rest and 5 out of 10 with activity. She has had some previous issues with her right knee and now that feels a bit loose as well. History: Patient's relevant past family, medical, and social history are reviewed as part of today's visit. ROS of pertinent positives and negatives as above; otherwise negative. General Exam:    Vitals: Resp. rate 12, height 5' 5\" (1.651 m), weight 130 lb (59 kg). Constitutional: Patient is adequately groomed with no evidence of malnutrition  Mental Status: The patient is oriented to time, place and person. The patient's mood and affect are appropriate. Gait:  Patient walks with normal gait and station. Lymphatic: The lymphatic examination bilaterally reveals all areas to be without enlargement or induration. Vascular: Examination reveals no swelling or calf tenderness. Peripheral pulses are palpable and 2+. Neurological: The patient has good coordination. There is no weakness or sensory deficit. Skin:    Head/Neck: inspection reveals no rashes, ulcerations or lesions. Trunk:  inspection reveals no rashes, ulcerations or lesions. Right Lower Extremity: inspection reveals no rashes, ulcerations or lesions. Left Lower Extremity: inspection reveals no rashes, ulcerations or lesions. After has full range of motion with 90 degrees of straight leg raise. When positioned prone she has significant tenderness on the distal third of the medial hamstrings without deformity. With an escort in the room the proximal hamstring origin was examined and felt to be intact.   She has had discomfort with bridge maneuver but is certainly able to perform it and there is no bowstring sign noted. She has no bruising and no deformity noted. Assessment: Right medial hamstring strain. Plan: Medrol Dosepak. She thinks she can do her therapy on her own. If she cannot she will let us know next week and we will make a referral.  Anticipate at least 4 weeks before return to tennis. She will not take her ibuprofen with the Dosepak.

## 2024-05-08 ENCOUNTER — TELEPHONE (OUTPATIENT)
Dept: ORTHOPEDIC SURGERY | Age: 54
End: 2024-05-08

## 2024-05-08 NOTE — TELEPHONE ENCOUNTER
Appointment Request     Patient requesting:  FOR AUGIE TO REVIEW FOR APPT  Patient Contact Number: [phone]       NP/LEFT SHOULDER PAIN FROM WORKING OUT/NO XR/NO ER, NO PREV SX - WENT TO A PRIVATE THERAPIST ON HER OWN - NOBODY ORDERED IT.  THIS PT HAS SEEN DR MCGILL BEFORE BUT IT WAS IN 2022.  PLEASE CALL PT AT ABOVE PHONE # ABOUT AN APPT.

## 2024-05-13 ENCOUNTER — TELEPHONE (OUTPATIENT)
Dept: ORTHOPEDIC SURGERY | Age: 54
End: 2024-05-13

## 2024-05-13 ENCOUNTER — OFFICE VISIT (OUTPATIENT)
Dept: ORTHOPEDIC SURGERY | Age: 54
End: 2024-05-13
Payer: COMMERCIAL

## 2024-05-13 VITALS — BODY MASS INDEX: 21.66 KG/M2 | WEIGHT: 130 LBS | HEIGHT: 65 IN

## 2024-05-13 DIAGNOSIS — M25.512 ACUTE PAIN OF LEFT SHOULDER: Primary | ICD-10-CM

## 2024-05-13 DIAGNOSIS — M75.32 CALCIFIC TENDINITIS OF LEFT SHOULDER: ICD-10-CM

## 2024-05-13 PROCEDURE — 99214 OFFICE O/P EST MOD 30 MIN: CPT | Performed by: ORTHOPAEDIC SURGERY

## 2024-05-13 RX ORDER — PREDNISONE 10 MG/1
TABLET ORAL
Qty: 50 TABLET | Refills: 0 | Status: SHIPPED | OUTPATIENT
Start: 2024-05-13

## 2024-05-13 SDOH — HEALTH STABILITY: PHYSICAL HEALTH: ON AVERAGE, HOW MANY DAYS PER WEEK DO YOU ENGAGE IN MODERATE TO STRENUOUS EXERCISE (LIKE A BRISK WALK)?: 6 DAYS

## 2024-05-13 SDOH — HEALTH STABILITY: PHYSICAL HEALTH: ON AVERAGE, HOW MANY MINUTES DO YOU ENGAGE IN EXERCISE AT THIS LEVEL?: 50 MIN

## 2024-05-13 NOTE — TELEPHONE ENCOUNTER
General Question     Subject: RESTRICTIONS  Patient and /or Facility Request: Sima Chatterjee   Contact Number: [phone]     PT CLLED TO ASK IF SHE HAS ANY RESTRICTIONS FOR WORKING OUT    PLEASE CLL PT TO ADV

## 2024-05-13 NOTE — PROGRESS NOTES
Sima Chatterjee is seen today for pain involving the left shoulder.  Pain started when she was working out a few months ago.  She began to have very severe anterior shoulder pain.  She is been working with a private physical therapist at her tennis [facility] with dry needling, modalities, and exercises.  Those have helped with her range of motion but she still has pain.  She has pain with quick activities.  She has significant discomfort at night.  Ibuprofen is not helpful.  Pain ranges from 5-8 out of 10.    She is right-hand dominant.    She is hypothyroid but is otherwise healthy.        History: Patient's relevant past family, medical, and social history are reviewed as part of today's visit. ROS of pertinent positives and negatives as above; otherwise negative.    General Exam:    Vitals: Height 1.651 m (5' 5\"), weight 59 kg (130 lb).  Constitutional: Patient is adequately groomed with no evidence of malnutrition  Mental Status: The patient is oriented to time, place and person.  The patient's mood and affect are appropriate.  Gait:  Patient walks with normal gait and station.  Lymphatic: The lymphatic examination bilaterally reveals all areas to be without enlargement or induration.  Vascular: Examination reveals no swelling or calf tenderness.  Peripheral pulses are palpable and 2+.  Neurological: The patient has good coordination.  There is no weakness or sensory deficit.    Skin:    Head/Neck: inspection reveals no rashes, ulcerations or lesions.  Trunk:  inspection reveals no rashes, ulcerations or lesions.  Right Lower Extremity: inspection reveals no rashes, ulcerations or lesions.  Left Lower Extremity: inspection reveals no rashes, ulcerations or lesions.      Examination of the cervical spine reveals no restriction in motion.  There are no reproduction of symptoms into either arm with flexion, extension, rotation or palpation.  The patient has a negative Spurling sign, and no tenderness.    Examination

## 2024-06-13 ENCOUNTER — OFFICE VISIT (OUTPATIENT)
Dept: ORTHOPEDIC SURGERY | Age: 54
End: 2024-06-13
Payer: COMMERCIAL

## 2024-06-13 VITALS — BODY MASS INDEX: 21.66 KG/M2 | WEIGHT: 130 LBS | HEIGHT: 65 IN

## 2024-06-13 DIAGNOSIS — M25.512 ACUTE PAIN OF LEFT SHOULDER: ICD-10-CM

## 2024-06-13 DIAGNOSIS — M75.32 CALCIFIC TENDINITIS OF LEFT SHOULDER: Primary | ICD-10-CM

## 2024-06-13 PROCEDURE — 99212 OFFICE O/P EST SF 10 MIN: CPT | Performed by: ORTHOPAEDIC SURGERY

## 2024-06-13 RX ORDER — SUMATRIPTAN 100 MG/1
100 TABLET, FILM COATED ORAL
COMMUNITY
Start: 2023-03-08

## 2024-06-13 NOTE — PROGRESS NOTES
Sima Chatterjee returns today to follow-up her left shoulder calcific tendinitis.  We started prednisone and she was doing physical therapy with her private physical therapist.    Today she reports some improvement with the steroid but a moderate level of persistent discomfort.  She has left knee pain but if she tries to exercise she certainly has symptoms.  Pain is about 4 5 out of 10.  It occurs throughout the shoulder.    General Exam:    Vitals: Height 1.651 m (5' 5\"), weight 59 kg (130 lb).  Constitutional: Patient is adequately groomed with no evidence of malnutrition  Mental Status: The patient is oriented to time, place and person.  The patient's mood and affect are appropriate.  Left shoulder has tenderness anteriorly.  She has pain stressing the supra and infraspinatus but no restriction of motion.    Left shoulder x-rays were again reviewed showing a large calcific deposit at the supraspinatus    Assessment: Ongoing discomfort associated left shoulder calcific tendinitis.    Plan: MRI scan left shoulder to determine the next step in treatment.  Follow-up with me after the scan

## 2024-06-24 ENCOUNTER — OFFICE VISIT (OUTPATIENT)
Dept: ORTHOPEDIC SURGERY | Age: 54
End: 2024-06-24
Payer: COMMERCIAL

## 2024-06-24 ENCOUNTER — TELEPHONE (OUTPATIENT)
Dept: ORTHOPEDIC SURGERY | Age: 54
End: 2024-06-24

## 2024-06-24 VITALS — BODY MASS INDEX: 21.66 KG/M2 | RESPIRATION RATE: 12 BRPM | WEIGHT: 130 LBS | HEIGHT: 65 IN

## 2024-06-24 DIAGNOSIS — M75.32 CALCIFIC TENDINITIS OF LEFT SHOULDER: Primary | ICD-10-CM

## 2024-06-24 DIAGNOSIS — M25.512 ACUTE PAIN OF LEFT SHOULDER: ICD-10-CM

## 2024-06-24 PROCEDURE — 99214 OFFICE O/P EST MOD 30 MIN: CPT | Performed by: ORTHOPAEDIC SURGERY

## 2024-06-24 PROCEDURE — 20610 DRAIN/INJ JOINT/BURSA W/O US: CPT | Performed by: ORTHOPAEDIC SURGERY

## 2024-06-24 RX ORDER — LEVOTHYROXINE SODIUM 0.1 MG/1
TABLET ORAL
COMMUNITY
Start: 2024-06-13

## 2024-06-24 RX ORDER — METHYLPREDNISOLONE ACETATE 40 MG/ML
80 INJECTION, SUSPENSION INTRA-ARTICULAR; INTRALESIONAL; INTRAMUSCULAR; SOFT TISSUE ONCE
Status: COMPLETED | OUTPATIENT
Start: 2024-06-24 | End: 2024-06-24

## 2024-06-24 RX ADMIN — METHYLPREDNISOLONE ACETATE 80 MG: 40 INJECTION, SUSPENSION INTRA-ARTICULAR; INTRALESIONAL; INTRAMUSCULAR; SOFT TISSUE at 16:47

## 2024-06-24 RX ADMIN — Medication 4 ML: at 16:46

## 2024-06-24 NOTE — PROGRESS NOTES
Sima Chatterjee returns today to follow-up the MRI scan of her left shoulder.  She has very little pain during the day but pain can be 4 out of 10 at night.    General Exam:    Vitals: Resp. rate 12, height 1.651 m (5' 5\"), weight 59 kg (130 lb).  Constitutional: Patient is adequately groomed with no evidence of malnutrition  Mental Status: The patient is oriented to time, place and person.  The patient's mood and affect are appropriate.    Left shoulder has some tenderness laterally.  She has good strength in the cuff and no significant restriction of motion.    Left shoulder MRI is reviewed.  It demonstrates:          Exam Date: 6/20/2024   Exam Description: MR Left Shoulder joint w/o Contrast   HISTORY: 54-year-old female with calcific tendonitis.   TECHNICAL FACTORS: Long- and short-axis fat- and water-weighted images were performed.   COMPARISON: None.   FINDINGS: ROTATOR CUFF:   Supraspinatus: Although usually peritendinous, both peritendinous and intratendinous hydroxyapatite dihydrate deposition or HADD  so-called calcific tendinitis is present with an overall length of 1.75 cm.  Reactive bursitis is scant.   Infraspinatus: Intact.   Subscapularis: Intact.   Teres Minor: Intact.   Biceps Tendon and [place]: Intact.   ACROMIOCLAVICULAR JOINT: No bony hypertrophy or degenerative changes of the AC joint. No evidence of AC joint separation or AC ligament injury.   Coracoclavicular Ligaments: Intact.   SUBACROMIAL ARCH/OUTLET: Normal positioning of the acromion. No evidence of lateral outlet stenosis or impingement.   SUBACROMIAL/SUBDELTOID BURSA: Unremarkable.   GLENOHUMERAL JOINT:   Articular Surfaces: No high-grade chondromalacia of the glenohumeral articular surfaces.   Glenoid Labrum: No traumatic or displaced labral tear.   Glenohumeral Ligaments (IGHL, MGHL, SGHL): Intact.   GENERAL:   Bones: No acute fracture or evidence of glenohumeral dislocation injury. The humeral head is centered within the glenoid.

## 2024-07-02 ENCOUNTER — HOSPITAL ENCOUNTER (OUTPATIENT)
Dept: PHYSICAL THERAPY | Age: 54
Setting detail: THERAPIES SERIES
Discharge: HOME | End: 2024-07-02
Payer: COMMERCIAL

## 2024-07-02 DIAGNOSIS — M25.512 ACUTE PAIN OF LEFT SHOULDER: Primary | ICD-10-CM

## 2024-07-02 PROCEDURE — 97161 PT EVAL LOW COMPLEX 20 MIN: CPT

## 2024-07-02 PROCEDURE — 97112 NEUROMUSCULAR REEDUCATION: CPT

## 2024-07-02 PROCEDURE — 97110 THERAPEUTIC EXERCISES: CPT

## 2024-07-02 NOTE — PLAN OF CARE
[facility] [address], [place] [address] office: [phone] fax: [phone]     Physical Therapy Initial Evaluation Certification      Dear Nikita Campos MD,    We had the pleasure of evaluating the following patient for physical therapy services at [facility].  A summary of our findings can be found in the initial assessment below.  This includes our plan of care.  If you have any questions or concerns regarding these findings, please do not hesitate to contact me at the office phone number listed above.  Thank you for the referral.     Physician Signature:_______________________________Date:__________________  By signing above (or electronic signature), therapist’s plan is approved by physician       Physical Therapy: TREATMENT/PROGRESS NOTE   Patient: Sima Chatterjee (54 y.o. female)   Examination Date: 2024   :  1970 MRN: [id number]   Visit #: 1   Insurance Allowable Auth Needed   [id number] []Yes    []No    Insurance: Payor: [facility] / Plan: [facility] - [place] PPO / Product Type: *No Product type* /   Insurance ID: [id number] - ([facility])  Secondary Insurance (if applicable):    Treatment Diagnosis:     ICD-10-CM    1. Acute pain of left shoulder  M25.512          Medical Diagnosis:  Calcific tendinitis of left shoulder [M75.32]   Referring Physician: Nikita Campos MD  PCP: Solitario Verma MD     Plan of care signed (Y/N): NA    Date of Patient follow up with Physician: 24     Progress Report/POC: EVAL today  POC update due: (10 visits /OR AUTH LIMITS, whichever is less)  2024                                             Precautions/ Contra-indications:           Latex allergy:  NO  Pacemaker:    NO  Contraindications for Manipulation: None  Date of Surgery: n/a  Other:    Red Flags:  None    C-SSRS Triggered by Intake questionnaire:   Patient answered 'NO' to both behavioral questions

## 2024-07-18 ENCOUNTER — APPOINTMENT (OUTPATIENT)
Dept: PHYSICAL THERAPY | Age: 54
End: 2024-07-18
Payer: COMMERCIAL

## 2024-07-24 ENCOUNTER — OFFICE VISIT (OUTPATIENT)
Dept: ORTHOPEDIC SURGERY | Age: 54
End: 2024-07-24
Payer: COMMERCIAL

## 2024-07-24 VITALS — WEIGHT: 130 LBS | BODY MASS INDEX: 21.66 KG/M2 | HEIGHT: 65 IN

## 2024-07-24 DIAGNOSIS — M75.32 CALCIFIC TENDINITIS OF LEFT SHOULDER: Primary | ICD-10-CM

## 2024-07-24 PROCEDURE — 99212 OFFICE O/P EST SF 10 MIN: CPT | Performed by: ORTHOPAEDIC SURGERY

## 2024-07-24 RX ORDER — LEVOTHYROXINE SODIUM 112 UG/1
112 TABLET ORAL
COMMUNITY
Start: 2024-06-26

## 2024-07-24 NOTE — PROGRESS NOTES
Sima Chatterjee returns today to follow-up her left shoulder calcific tendinitis.  She feels significantly better.  She is able to work out now.  She sometimes has discomfort that can be up to 4 out of 10 but it resolves quickly.  She is sleeping fine.  She is doing her home exercise program.      General Exam:    Vitals: Height 1.651 m (5' 5\"), weight 59 kg (130 lb).  Constitutional: Patient is adequately groomed with no evidence of malnutrition  Mental Status: The patient is oriented to time, place and person.  The patient's mood and affect are appropriate.          Left shoulder today has very trace tenderness over the anterior shoulder.  She has good strength throughout the cuff with normal motion.    At this point we will continue to treat her with observation with the expectation she will have continued resolution.  If she has persistent symptoms I would get a new x-ray to see if she has had any resorption of the calcific deposit and we can then debate utility of surgery versus further nonoperative management.  She agrees with this plan.  All questions have been answered.

## 2024-10-04 ENCOUNTER — TELEPHONE (OUTPATIENT)
Dept: ORTHOPEDIC SURGERY | Age: 54
End: 2024-10-04

## 2024-10-04 NOTE — TELEPHONE ENCOUNTER
General Question     Subject: LT SHOULDER PAIN   Patient and /or Facility Request: Sima Chatterjee   Contact Number: [phone]     PATIENT CALLED REQ TO SPEAK WITH SOMEONE REGARDING HER LT SHOULDER PAIN     SHE STATED SHE SPOKE WITH SOMEONE PERTAINING TO SCHEDULING A POSSIBLE PROCEDURE     SHE STATED SHE IS NOT SURE IF SHE SHOULD FOLLOW UP WITH  FIRST OR JUST SCHEDULE THE PROCEDURE     PLEASE CALL PATIENT BACK AT THE ABOVE NUMBER TO FURTHER ASSIST

## 2024-10-07 ENCOUNTER — OFFICE VISIT (OUTPATIENT)
Dept: ORTHOPEDIC SURGERY | Age: 54
End: 2024-10-07

## 2024-10-07 VITALS — BODY MASS INDEX: 21.67 KG/M2 | WEIGHT: 130.07 LBS | HEIGHT: 65 IN

## 2024-10-07 DIAGNOSIS — M75.112 NONTRAUMATIC INCOMPLETE TEAR OF LEFT ROTATOR CUFF: ICD-10-CM

## 2024-10-07 DIAGNOSIS — M75.32 CALCIFIC TENDINITIS OF LEFT SHOULDER: Primary | ICD-10-CM

## 2024-10-07 DIAGNOSIS — S46.012S TRAUMATIC INCOMPLETE TEAR OF LEFT ROTATOR CUFF, SEQUELA: ICD-10-CM

## 2024-10-07 DIAGNOSIS — M67.912 TENDINOPATHY OF LEFT ROTATOR CUFF: ICD-10-CM

## 2024-10-07 NOTE — PROGRESS NOTES
rashes, ulcerations or lesions.  Strength and tone are normal.     Neurologic -Light touch sensation and manual muscle testing is normal C5-C8 . Biceps and triceps reflexes are symmetric and +2. Spurlling sign is negative       Office Imaging Results/Procedures PerformedToday:            Office Procedures:     Orders Placed This Encounter   Procedures    US EXTREMITY LEFT NON VASC LIMITED     Standing Status:   Future     Number of Occurrences:   1     Standing Expiration Date:   10/7/2025     Order Specific Question:   Reason for exam:     Answer:   dx     Complete Ultrasound Shoulder  Logic E ultrasound  12 MHz    The patient was position seated on examination table.  The anterior soft tissues of the left shoulder were evaluated initially using the linear transducer.  Image optimization was obtained.  The proximal biceps tendon was evaluated extensively in short axis and long axis and was noted to have a normal sonographic echotexture without evidence of high-grade tendinopathy or discrete tear.  With dynamic imaging, there was no evidence of instability of the proximal biceps tendon.    The subscapularis tendon was then evaluated extensively and short axis and long axis.  The tendon was noted to have a normal caliber/volume without evidence of high-grade tendinopathy or discrete tear.      The patient was then positioned in CRASS position and the supraspinatus and infraspinatus tendons were evaluated extensively in short axis and long axis.  The supraspinatus had a markedly abnormal sonographic appearance with heterogeneous calcification nearly involving the the full width of the supraspinatus footplate most pronounced in its posterior segment distribution.  There was footplate irregularity of the far anterior distal footplate and loss of convexity of the tendon at this anatomic location suggestive of full-thickness incomplete tear without retraction    The infraspinatus had a normal sonographic appearance

## 2024-10-07 NOTE — TELEPHONE ENCOUNTER
Called and spoke with patient. I offered her an appointment with Dr. Campos. She is asking about a procedure with Dr. Rosenbaum.   I explained that she will need to discuss this with his staff. She stated that she will call to discuss with Dr. Rosenbaum's team.

## 2024-10-14 ENCOUNTER — TELEPHONE (OUTPATIENT)
Dept: ORTHOPEDIC SURGERY | Age: 54
End: 2024-10-14

## 2024-10-14 NOTE — TELEPHONE ENCOUNTER
General Question     Subject: STATUS UPDATE ON LT SHOULDER TREATMENT  Patient and /or Facility Request: Sima Chatterjee   Contact Number: [phone]       PATIENT CALLED IN TO CHECK STATUS OF HER LT SHOULDER TREATMENT. WHAT THE NEXT STEPS.     PLEASE ADVISE

## 2024-10-16 ENCOUNTER — TELEPHONE (OUTPATIENT)
Dept: ORTHOPEDIC SURGERY | Age: 54
End: 2024-10-16

## 2024-10-16 NOTE — TELEPHONE ENCOUNTER
General Question      Subject: STATUS UPDATE ON LT SHOULDER TREATMENT  Patient and /or Facility Request: Sima Chatterjee   Contact Number: [phone]         PATIENT CALLED IN TO CHECK STATUS   OF HER LT SHOULDER   TREATMENT. PATIENT HAS NOT HEARD   BACK FROM .    WHAT THE NEXT STEPS.      PLEASE ADVISE

## 2024-10-17 NOTE — TELEPHONE ENCOUNTER
Called and spoke with patient. I explained that she needs to see Dr. Campos to discuss surgery. Patient stated that she does not want surgery but she will come in to discuss with Dr. Campos. Appointment scheduled for 10-22-24.

## 2024-10-22 ENCOUNTER — OFFICE VISIT (OUTPATIENT)
Dept: ORTHOPEDIC SURGERY | Age: 54
End: 2024-10-22
Payer: COMMERCIAL

## 2024-10-22 VITALS — WEIGHT: 130 LBS | RESPIRATION RATE: 12 BRPM | HEIGHT: 65 IN | BODY MASS INDEX: 21.66 KG/M2

## 2024-10-22 DIAGNOSIS — M25.512 ACUTE PAIN OF LEFT SHOULDER: ICD-10-CM

## 2024-10-22 DIAGNOSIS — M75.32 CALCIFIC TENDINITIS OF LEFT SHOULDER: Primary | ICD-10-CM

## 2024-10-22 PROCEDURE — 99214 OFFICE O/P EST MOD 30 MIN: CPT | Performed by: ORTHOPAEDIC SURGERY

## 2024-10-28 ENCOUNTER — PREP FOR PROCEDURE (OUTPATIENT)
Dept: ORTHOPEDIC SURGERY | Age: 54
End: 2024-10-28

## 2024-10-28 DIAGNOSIS — M75.32 CALCIFIC TENDINITIS OF LEFT SHOULDER: ICD-10-CM

## 2024-12-04 ENCOUNTER — TELEPHONE (OUTPATIENT)
Dept: ORTHOPEDIC SURGERY | Age: 54
End: 2024-12-04

## 2024-12-05 ENCOUNTER — TELEPHONE (OUTPATIENT)
Dept: ORTHOPEDIC SURGERY | Age: 54
End: 2024-12-05

## 2024-12-05 NOTE — TELEPHONE ENCOUNTER
General Question     Subject: CANCEL SURGERY  Patient and /or Facility Request: Sima Chatterjee   Contact Number: [phone]      PATIENT STATED SHE IS WANTING TO CANCEL HER SURGERY AS SHE FEELS BETTER AFTER DOING PHYSICAL THERAPY    PLEASE CALL BACK THE ABOVE NUMBER

## 2024-12-05 NOTE — TELEPHONE ENCOUNTER
Called and spoke with patient. She is having success with shock wave therapy on her left shoulder. She would like to cancel surgery at this time. Surgery cancelled.

## 2024-12-09 ENCOUNTER — TELEPHONE (OUTPATIENT)
Dept: ORTHOPEDIC SURGERY | Age: 54
End: 2024-12-09